# Patient Record
Sex: FEMALE | Race: WHITE | NOT HISPANIC OR LATINO
[De-identification: names, ages, dates, MRNs, and addresses within clinical notes are randomized per-mention and may not be internally consistent; named-entity substitution may affect disease eponyms.]

---

## 2017-07-05 ENCOUNTER — FORM ENCOUNTER (OUTPATIENT)
Age: 72
End: 2017-07-05

## 2017-07-25 ENCOUNTER — FORM ENCOUNTER (OUTPATIENT)
Age: 72
End: 2017-07-25

## 2017-12-27 ENCOUNTER — FORM ENCOUNTER (OUTPATIENT)
Age: 72
End: 2017-12-27

## 2018-02-07 ENCOUNTER — FORM ENCOUNTER (OUTPATIENT)
Age: 73
End: 2018-02-07

## 2018-07-11 ENCOUNTER — FORM ENCOUNTER (OUTPATIENT)
Age: 73
End: 2018-07-11

## 2018-07-23 ENCOUNTER — FORM ENCOUNTER (OUTPATIENT)
Age: 73
End: 2018-07-23

## 2019-07-15 ENCOUNTER — FORM ENCOUNTER (OUTPATIENT)
Age: 74
End: 2019-07-15

## 2019-07-24 ENCOUNTER — FORM ENCOUNTER (OUTPATIENT)
Age: 74
End: 2019-07-24

## 2020-09-09 ENCOUNTER — FORM ENCOUNTER (OUTPATIENT)
Age: 75
End: 2020-09-09

## 2020-10-04 ENCOUNTER — FORM ENCOUNTER (OUTPATIENT)
Age: 75
End: 2020-10-04

## 2021-06-03 ENCOUNTER — APPOINTMENT (OUTPATIENT)
Dept: BREAST CENTER | Facility: CLINIC | Age: 76
End: 2021-06-03
Payer: MEDICARE

## 2021-06-03 ENCOUNTER — NON-APPOINTMENT (OUTPATIENT)
Age: 76
End: 2021-06-03

## 2021-06-03 VITALS
DIASTOLIC BLOOD PRESSURE: 71 MMHG | HEART RATE: 77 BPM | SYSTOLIC BLOOD PRESSURE: 108 MMHG | BODY MASS INDEX: 20.83 KG/M2 | WEIGHT: 125 LBS | HEIGHT: 65 IN

## 2021-06-03 DIAGNOSIS — Z78.9 OTHER SPECIFIED HEALTH STATUS: ICD-10-CM

## 2021-06-03 DIAGNOSIS — N64.9 DISORDER OF BREAST, UNSPECIFIED: ICD-10-CM

## 2021-06-03 DIAGNOSIS — Z80.3 FAMILY HISTORY OF MALIGNANT NEOPLASM OF BREAST: ICD-10-CM

## 2021-06-03 DIAGNOSIS — Z00.00 ENCOUNTER FOR GENERAL ADULT MEDICAL EXAMINATION W/OUT ABNORMAL FINDINGS: ICD-10-CM

## 2021-06-03 DIAGNOSIS — Z13.71 ENCOUNTER FOR NONPROCREATIVE SCREENING FOR GENETIC DISEASE CARRIER STATUS: ICD-10-CM

## 2021-06-03 PROCEDURE — 99213 OFFICE O/P EST LOW 20 MIN: CPT

## 2021-06-04 PROBLEM — Z13.71 BRCA NEGATIVE: Status: ACTIVE | Noted: 2021-06-04

## 2021-06-04 PROBLEM — Z78.9 NON-SMOKER: Status: ACTIVE | Noted: 2021-06-03

## 2021-06-04 PROBLEM — N64.9 DISORDER OF BREAST, UNSPECIFIED: Status: ACTIVE | Noted: 2021-06-04

## 2021-06-04 PROBLEM — Z80.3 FAMILY HISTORY OF BREAST CANCER: Status: ACTIVE | Noted: 2021-06-04

## 2021-06-04 PROBLEM — Z00.00 ENCOUNTER FOR PREVENTIVE HEALTH EXAMINATION: Status: ACTIVE | Noted: 2021-05-20

## 2021-06-04 PROBLEM — Z78.9 CAFFEINE USE: Status: ACTIVE | Noted: 2021-06-03

## 2021-06-04 NOTE — PHYSICAL EXAM
[Supple] : supple [No Supraclavicular Adenopathy] : no supraclavicular adenopathy [No Cervical Adenopathy] : no cervical adenopathy [Examined in the supine and seated position] : examined in the supine and seated position [No dominant masses] : no dominant masses in right breast  [No dominant masses] : no dominant masses left breast [No Nipple Retraction] : no left nipple retraction [No Nipple Discharge] : no left nipple discharge [No Axillary Lymphadenopathy] : no left axillary lymphadenopathy

## 2021-06-06 NOTE — ASSESSMENT
[FreeTextEntry1] : 75yo BRCA (-) female with significant Fhx of breast ca in sister 73, maternal aunt 70, maternal aunt 40, maternal cousin 29, maternal cousin 30, maternal cousin 40, and maternal cousin 70. Patient presents for breast cancer screening. Bilateral mammo/sono in October 2020 BIRADS 2. Patient due for repeat mammo/sono and reexamination in October.

## 2021-06-06 NOTE — HISTORY OF PRESENT ILLNESS
[FreeTextEntry1] : 75yo BRCA (-) female previously seen by Dr. Almodovar last seen 10/5/20 by Dr. Sousa with Fhx of breast ca in sister 73, maternal aunt 70, maternal aunt 40, maternal cousin 29, maternal cousin 30, maternal cousin 40, and maternal cousin 70 presents for breast cancer screening. Of note, patient is taking exogenous estrogen. \par ISAC Arroyo Lifetime Risk Score 12.3%\par

## 2021-06-06 NOTE — PAST MEDICAL HISTORY
[Menarche Age ____] : age at menarche was [unfilled] [Menopause Age____] : age at menopause was [unfilled] [History of Hormone Replacement Treatment] : has a history of hormone replacement treatment [Total Preg ___] : G[unfilled] [Live Births ___] : P[unfilled]  [Age At Live Birth ___] : Age at live birth: [unfilled] [FreeTextEntry2] : Miscarriage  2  [FreeTextEntry7] : yes  Iud   [FreeTextEntry8] : yes

## 2021-06-06 NOTE — DATA REVIEWED
[FreeTextEntry1] : 07/06/2017: Screening B/L MG - Negative. BIRADS 1 \par 12/28/2017: B/L Screening US & palpable abnormality- R 12:00, 1cmfn, 0.5 cm septated cyst, benign. L 5:00, 1 cmfn, 0.3 cm cyst, benign. L 7:00, 2cmfn, 0.8 cm cyst, benign. BIRADS 2 \par 07/12/2018: B/L Screening MG- Negative. BIRADS 1 \par 10/05/2020: B/L Dx MG and US- extremely Dense. US yields small cysts at R 12:20, 3cmfn and L 11:00, 4cmfn both measuring 1 cm. BIRADS 2 \par

## 2021-10-12 ENCOUNTER — RESULT REVIEW (OUTPATIENT)
Age: 76
End: 2021-10-12

## 2021-10-12 ENCOUNTER — APPOINTMENT (OUTPATIENT)
Dept: ULTRASOUND IMAGING | Facility: CLINIC | Age: 76
End: 2021-10-12
Payer: MEDICARE

## 2021-10-12 ENCOUNTER — APPOINTMENT (OUTPATIENT)
Dept: MAMMOGRAPHY | Facility: CLINIC | Age: 76
End: 2021-10-12
Payer: MEDICARE

## 2021-10-12 ENCOUNTER — OUTPATIENT (OUTPATIENT)
Dept: OUTPATIENT SERVICES | Facility: HOSPITAL | Age: 76
LOS: 1 days | End: 2021-10-12

## 2021-10-12 PROCEDURE — 76830 TRANSVAGINAL US NON-OB: CPT | Mod: 26

## 2021-10-12 PROCEDURE — 76641 ULTRASOUND BREAST COMPLETE: CPT | Mod: 26,50

## 2021-10-12 PROCEDURE — 77063 BREAST TOMOSYNTHESIS BI: CPT | Mod: 26

## 2021-10-12 PROCEDURE — 77067 SCR MAMMO BI INCL CAD: CPT | Mod: 26

## 2021-10-21 ENCOUNTER — NON-APPOINTMENT (OUTPATIENT)
Age: 76
End: 2021-10-21

## 2021-10-26 ENCOUNTER — APPOINTMENT (OUTPATIENT)
Dept: BREAST CENTER | Facility: CLINIC | Age: 76
End: 2021-10-26

## 2022-02-21 ENCOUNTER — OUTPATIENT (OUTPATIENT)
Dept: OUTPATIENT SERVICES | Facility: HOSPITAL | Age: 77
LOS: 1 days | End: 2022-02-21

## 2022-02-21 ENCOUNTER — APPOINTMENT (OUTPATIENT)
Dept: RADIOLOGY | Facility: CLINIC | Age: 77
End: 2022-02-21
Payer: MEDICARE

## 2022-02-21 PROCEDURE — 77080 DXA BONE DENSITY AXIAL: CPT | Mod: 26

## 2022-10-29 ENCOUNTER — APPOINTMENT (OUTPATIENT)
Dept: ULTRASOUND IMAGING | Facility: CLINIC | Age: 77
End: 2022-10-29

## 2022-10-29 ENCOUNTER — APPOINTMENT (OUTPATIENT)
Dept: MAMMOGRAPHY | Facility: CLINIC | Age: 77
End: 2022-10-29

## 2022-10-29 ENCOUNTER — OUTPATIENT (OUTPATIENT)
Dept: OUTPATIENT SERVICES | Facility: HOSPITAL | Age: 77
LOS: 1 days | End: 2022-10-29

## 2022-10-29 PROCEDURE — 77067 SCR MAMMO BI INCL CAD: CPT | Mod: 26

## 2022-10-29 PROCEDURE — 77063 BREAST TOMOSYNTHESIS BI: CPT | Mod: 26

## 2022-10-29 PROCEDURE — 76641 ULTRASOUND BREAST COMPLETE: CPT | Mod: 26,50

## 2023-05-23 ENCOUNTER — OUTPATIENT (OUTPATIENT)
Dept: OUTPATIENT SERVICES | Facility: HOSPITAL | Age: 78
LOS: 1 days | End: 2023-05-23

## 2023-05-23 ENCOUNTER — APPOINTMENT (OUTPATIENT)
Dept: ULTRASOUND IMAGING | Facility: CLINIC | Age: 78
End: 2023-05-23
Payer: COMMERCIAL

## 2023-05-23 ENCOUNTER — APPOINTMENT (OUTPATIENT)
Dept: MAMMOGRAPHY | Facility: CLINIC | Age: 78
End: 2023-05-23
Payer: COMMERCIAL

## 2023-05-23 PROCEDURE — 77065 DX MAMMO INCL CAD UNI: CPT | Mod: 26,RT

## 2023-05-23 PROCEDURE — G0279: CPT | Mod: 26

## 2023-05-23 PROCEDURE — 76641 ULTRASOUND BREAST COMPLETE: CPT | Mod: 26,RT

## 2023-10-24 ENCOUNTER — OUTPATIENT (OUTPATIENT)
Dept: OUTPATIENT SERVICES | Facility: HOSPITAL | Age: 78
LOS: 1 days | End: 2023-10-24

## 2023-10-24 PROCEDURE — 77080 DXA BONE DENSITY AXIAL: CPT | Mod: 26

## 2023-10-30 ENCOUNTER — APPOINTMENT (OUTPATIENT)
Dept: RADIOLOGY | Facility: CLINIC | Age: 78
End: 2023-10-30
Payer: MEDICARE

## 2023-12-15 ENCOUNTER — APPOINTMENT (OUTPATIENT)
Dept: ULTRASOUND IMAGING | Facility: CLINIC | Age: 78
End: 2023-12-15
Payer: MEDICARE

## 2023-12-15 ENCOUNTER — APPOINTMENT (OUTPATIENT)
Dept: MAMMOGRAPHY | Facility: CLINIC | Age: 78
End: 2023-12-15
Payer: MEDICARE

## 2023-12-15 ENCOUNTER — OUTPATIENT (OUTPATIENT)
Dept: OUTPATIENT SERVICES | Facility: HOSPITAL | Age: 78
LOS: 1 days | End: 2023-12-15

## 2023-12-15 PROCEDURE — 77063 BREAST TOMOSYNTHESIS BI: CPT | Mod: 26

## 2023-12-15 PROCEDURE — 77067 SCR MAMMO BI INCL CAD: CPT | Mod: 26

## 2024-01-18 ENCOUNTER — EMERGENCY (EMERGENCY)
Facility: HOSPITAL | Age: 79
LOS: 1 days | Discharge: LEFT BEFORE DISCHARGE | End: 2024-01-18
Attending: EMERGENCY MEDICINE | Admitting: EMERGENCY MEDICINE
Payer: MEDICARE

## 2024-01-18 ENCOUNTER — INPATIENT (INPATIENT)
Facility: HOSPITAL | Age: 79
LOS: 0 days | Discharge: HOME | DRG: 446 | End: 2024-01-19
Attending: SURGERY | Admitting: SURGERY
Payer: COMMERCIAL

## 2024-01-18 VITALS
WEIGHT: 119.93 LBS | DIASTOLIC BLOOD PRESSURE: 74 MMHG | HEART RATE: 101 BPM | SYSTOLIC BLOOD PRESSURE: 112 MMHG | TEMPERATURE: 98 F | RESPIRATION RATE: 18 BRPM | HEIGHT: 65 IN | OXYGEN SATURATION: 97 %

## 2024-01-18 VITALS
DIASTOLIC BLOOD PRESSURE: 90 MMHG | RESPIRATION RATE: 18 BRPM | WEIGHT: 119.93 LBS | HEART RATE: 75 BPM | OXYGEN SATURATION: 96 % | TEMPERATURE: 97 F | SYSTOLIC BLOOD PRESSURE: 169 MMHG

## 2024-01-18 VITALS
SYSTOLIC BLOOD PRESSURE: 156 MMHG | HEART RATE: 73 BPM | RESPIRATION RATE: 16 BRPM | DIASTOLIC BLOOD PRESSURE: 84 MMHG | TEMPERATURE: 97 F | OXYGEN SATURATION: 99 %

## 2024-01-18 DIAGNOSIS — R11.10 VOMITING, UNSPECIFIED: ICD-10-CM

## 2024-01-18 DIAGNOSIS — Z53.29 PROCEDURE AND TREATMENT NOT CARRIED OUT BECAUSE OF PATIENT'S DECISION FOR OTHER REASONS: ICD-10-CM

## 2024-01-18 DIAGNOSIS — Z87.19 PERSONAL HISTORY OF OTHER DISEASES OF THE DIGESTIVE SYSTEM: ICD-10-CM

## 2024-01-18 DIAGNOSIS — K83.09 OTHER CHOLANGITIS: ICD-10-CM

## 2024-01-18 LAB
ALBUMIN SERPL ELPH-MCNC: 4 G/DL — SIGNIFICANT CHANGE UP (ref 3.4–5)
ALP SERPL-CCNC: 295 U/L — HIGH (ref 40–120)
ALT FLD-CCNC: 497 U/L — HIGH (ref 12–42)
ANION GAP SERPL CALC-SCNC: 10 MMOL/L — SIGNIFICANT CHANGE UP (ref 9–16)
AST SERPL-CCNC: 358 U/L — HIGH (ref 15–37)
BASOPHILS # BLD AUTO: 0.04 K/UL — SIGNIFICANT CHANGE UP (ref 0–0.2)
BASOPHILS NFR BLD AUTO: 0.5 % — SIGNIFICANT CHANGE UP (ref 0–2)
BILIRUB DIRECT SERPL-MCNC: 1 MG/DL — HIGH (ref 0–0.3)
BILIRUB SERPL-MCNC: 1.6 MG/DL — HIGH (ref 0.2–1.2)
BUN SERPL-MCNC: 10 MG/DL — SIGNIFICANT CHANGE UP (ref 7–23)
CALCIUM SERPL-MCNC: 9.8 MG/DL — SIGNIFICANT CHANGE UP (ref 8.5–10.5)
CHLORIDE SERPL-SCNC: 96 MMOL/L — SIGNIFICANT CHANGE UP (ref 96–108)
CO2 SERPL-SCNC: 30 MMOL/L — SIGNIFICANT CHANGE UP (ref 22–31)
CREAT SERPL-MCNC: 0.72 MG/DL — SIGNIFICANT CHANGE UP (ref 0.5–1.3)
EGFR: 87 ML/MIN/1.73M2 — SIGNIFICANT CHANGE UP
EOSINOPHIL # BLD AUTO: 0.2 K/UL — SIGNIFICANT CHANGE UP (ref 0–0.5)
EOSINOPHIL NFR BLD AUTO: 2.5 % — SIGNIFICANT CHANGE UP (ref 0–6)
GLUCOSE SERPL-MCNC: 148 MG/DL — HIGH (ref 70–99)
HCT VFR BLD CALC: 46.8 % — HIGH (ref 34.5–45)
HGB BLD-MCNC: 15.1 G/DL — SIGNIFICANT CHANGE UP (ref 11.5–15.5)
IMM GRANULOCYTES NFR BLD AUTO: 0.3 % — SIGNIFICANT CHANGE UP (ref 0–0.9)
LIDOCAIN IGE QN: 22 U/L — SIGNIFICANT CHANGE UP (ref 16–77)
LYMPHOCYTES # BLD AUTO: 0.69 K/UL — LOW (ref 1–3.3)
LYMPHOCYTES # BLD AUTO: 8.8 % — LOW (ref 13–44)
MCHC RBC-ENTMCNC: 29.8 PG — SIGNIFICANT CHANGE UP (ref 27–34)
MCHC RBC-ENTMCNC: 32.3 GM/DL — SIGNIFICANT CHANGE UP (ref 32–36)
MCV RBC AUTO: 92.3 FL — SIGNIFICANT CHANGE UP (ref 80–100)
MONOCYTES # BLD AUTO: 0.46 K/UL — SIGNIFICANT CHANGE UP (ref 0–0.9)
MONOCYTES NFR BLD AUTO: 5.8 % — SIGNIFICANT CHANGE UP (ref 2–14)
NEUTROPHILS # BLD AUTO: 6.47 K/UL — SIGNIFICANT CHANGE UP (ref 1.8–7.4)
NEUTROPHILS NFR BLD AUTO: 82.1 % — HIGH (ref 43–77)
NRBC # BLD: 0 /100 WBCS — SIGNIFICANT CHANGE UP (ref 0–0)
PLATELET # BLD AUTO: 267 K/UL — SIGNIFICANT CHANGE UP (ref 150–400)
POTASSIUM SERPL-MCNC: 3.2 MMOL/L — LOW (ref 3.5–5.3)
POTASSIUM SERPL-SCNC: 3.2 MMOL/L — LOW (ref 3.5–5.3)
PROT SERPL-MCNC: 8.3 G/DL — HIGH (ref 6.4–8.2)
RBC # BLD: 5.07 M/UL — SIGNIFICANT CHANGE UP (ref 3.8–5.2)
RBC # FLD: 12.9 % — SIGNIFICANT CHANGE UP (ref 10.3–14.5)
SODIUM SERPL-SCNC: 136 MMOL/L — SIGNIFICANT CHANGE UP (ref 132–145)
WBC # BLD: 7.88 K/UL — SIGNIFICANT CHANGE UP (ref 3.8–10.5)
WBC # FLD AUTO: 7.88 K/UL — SIGNIFICANT CHANGE UP (ref 3.8–10.5)

## 2024-01-18 PROCEDURE — 93010 ELECTROCARDIOGRAM REPORT: CPT

## 2024-01-18 PROCEDURE — 74177 CT ABD & PELVIS W/CONTRAST: CPT | Mod: 26,MH

## 2024-01-18 PROCEDURE — 99285 EMERGENCY DEPT VISIT HI MDM: CPT

## 2024-01-18 PROCEDURE — 76705 ECHO EXAM OF ABDOMEN: CPT | Mod: 26

## 2024-01-18 PROCEDURE — 99223 1ST HOSP IP/OBS HIGH 75: CPT

## 2024-01-18 RX ORDER — PIPERACILLIN AND TAZOBACTAM 4; .5 G/20ML; G/20ML
3.38 INJECTION, POWDER, LYOPHILIZED, FOR SOLUTION INTRAVENOUS ONCE
Refills: 0 | Status: COMPLETED | OUTPATIENT
Start: 2024-01-18 | End: 2024-01-18

## 2024-01-18 RX ORDER — PIPERACILLIN AND TAZOBACTAM 4; .5 G/20ML; G/20ML
3.38 INJECTION, POWDER, LYOPHILIZED, FOR SOLUTION INTRAVENOUS EVERY 8 HOURS
Refills: 0 | Status: DISCONTINUED | OUTPATIENT
Start: 2024-01-18 | End: 2024-01-18

## 2024-01-18 RX ORDER — SODIUM CHLORIDE 9 MG/ML
1000 INJECTION INTRAMUSCULAR; INTRAVENOUS; SUBCUTANEOUS ONCE
Refills: 0 | Status: COMPLETED | OUTPATIENT
Start: 2024-01-18 | End: 2024-01-18

## 2024-01-18 RX ORDER — POTASSIUM CHLORIDE 20 MEQ
10 PACKET (EA) ORAL
Refills: 0 | Status: COMPLETED | OUTPATIENT
Start: 2024-01-18 | End: 2024-01-19

## 2024-01-18 RX ORDER — PIPERACILLIN AND TAZOBACTAM 4; .5 G/20ML; G/20ML
3.38 INJECTION, POWDER, LYOPHILIZED, FOR SOLUTION INTRAVENOUS ONCE
Refills: 0 | Status: COMPLETED | OUTPATIENT
Start: 2024-01-19 | End: 2024-01-19

## 2024-01-18 RX ORDER — SODIUM CHLORIDE 9 MG/ML
1000 INJECTION, SOLUTION INTRAVENOUS
Refills: 0 | Status: DISCONTINUED | OUTPATIENT
Start: 2024-01-18 | End: 2024-01-19

## 2024-01-18 RX ORDER — PANTOPRAZOLE SODIUM 20 MG/1
1 TABLET, DELAYED RELEASE ORAL
Refills: 0 | DISCHARGE

## 2024-01-18 RX ORDER — ONDANSETRON 8 MG/1
4 TABLET, FILM COATED ORAL ONCE
Refills: 0 | Status: COMPLETED | OUTPATIENT
Start: 2024-01-18 | End: 2024-01-18

## 2024-01-18 RX ORDER — PIPERACILLIN AND TAZOBACTAM 4; .5 G/20ML; G/20ML
3.38 INJECTION, POWDER, LYOPHILIZED, FOR SOLUTION INTRAVENOUS EVERY 8 HOURS
Refills: 0 | Status: DISCONTINUED | OUTPATIENT
Start: 2024-01-19 | End: 2024-01-19

## 2024-01-18 RX ORDER — PANTOPRAZOLE SODIUM 20 MG/1
40 TABLET, DELAYED RELEASE ORAL
Refills: 0 | Status: DISCONTINUED | OUTPATIENT
Start: 2024-01-19 | End: 2024-01-19

## 2024-01-18 RX ORDER — MORPHINE SULFATE 50 MG/1
4 CAPSULE, EXTENDED RELEASE ORAL ONCE
Refills: 0 | Status: DISCONTINUED | OUTPATIENT
Start: 2024-01-18 | End: 2024-01-18

## 2024-01-18 RX ORDER — FAMOTIDINE 10 MG/ML
20 INJECTION INTRAVENOUS ONCE
Refills: 0 | Status: COMPLETED | OUTPATIENT
Start: 2024-01-18 | End: 2024-01-18

## 2024-01-18 RX ORDER — POTASSIUM CHLORIDE 20 MEQ
40 PACKET (EA) ORAL ONCE
Refills: 0 | Status: COMPLETED | OUTPATIENT
Start: 2024-01-18 | End: 2024-01-18

## 2024-01-18 RX ADMIN — MORPHINE SULFATE 4 MILLIGRAM(S): 50 CAPSULE, EXTENDED RELEASE ORAL at 09:25

## 2024-01-18 RX ADMIN — Medication 100 MILLIEQUIVALENT(S): at 22:41

## 2024-01-18 RX ADMIN — SODIUM CHLORIDE 1000 MILLILITER(S): 9 INJECTION INTRAMUSCULAR; INTRAVENOUS; SUBCUTANEOUS at 09:26

## 2024-01-18 RX ADMIN — PIPERACILLIN AND TAZOBACTAM 200 GRAM(S): 4; .5 INJECTION, POWDER, LYOPHILIZED, FOR SOLUTION INTRAVENOUS at 20:35

## 2024-01-18 RX ADMIN — Medication 40 MILLIEQUIVALENT(S): at 12:44

## 2024-01-18 RX ADMIN — SODIUM CHLORIDE 90 MILLILITER(S): 9 INJECTION, SOLUTION INTRAVENOUS at 20:50

## 2024-01-18 RX ADMIN — ONDANSETRON 4 MILLIGRAM(S): 8 TABLET, FILM COATED ORAL at 09:25

## 2024-01-18 RX ADMIN — FAMOTIDINE 20 MILLIGRAM(S): 10 INJECTION INTRAVENOUS at 09:25

## 2024-01-18 RX ADMIN — PIPERACILLIN AND TAZOBACTAM 200 GRAM(S): 4; .5 INJECTION, POWDER, LYOPHILIZED, FOR SOLUTION INTRAVENOUS at 14:51

## 2024-01-18 RX ADMIN — Medication 100 MILLIEQUIVALENT(S): at 20:49

## 2024-01-18 NOTE — H&P ADULT - NSICDXPASTMEDICALHX_GEN_ALL_CORE_FT
PAST MEDICAL HISTORY:  Acute diverticulitis     GERD (gastroesophageal reflux disease)     Hiatal hernia

## 2024-01-18 NOTE — ED ADULT NURSE NOTE - NSFALLUNIVINTERV_ED_ALL_ED
Bed/Stretcher in lowest position, wheels locked, appropriate side rails in place/Call bell, personal items and telephone in reach/Instruct patient to call for assistance before getting out of bed/chair/stretcher/Non-slip footwear applied when patient is off stretcher/[place] to call system/Physically safe environment - no spills, clutter or unnecessary equipment/Purposeful proactive rounding/Room/bathroom lighting operational, light cord in reach

## 2024-01-18 NOTE — ED ADULT TRIAGE NOTE - CHIEF COMPLAINT QUOTE
Pt presents to ED c/o pre op testing. Sent in for Dr. Magallon for gallbladder removal. +abdominal pain for a couple of days.

## 2024-01-18 NOTE — ED ADULT NURSE REASSESSMENT NOTE - NS ED NURSE REASSESS COMMENT FT1
Report received from Amber RICO for continuity of care. Pt in bed reporting 0/10 pain. Pt pending provider discussion. Plan of care ongoing

## 2024-01-18 NOTE — ED ADULT NURSE REASSESSMENT NOTE - NS ED NURSE REASSESS COMMENT FT1
patient is currently at [facility]; they tok themselves there after leaving the ED here at [facility]; left prior to RN returning back from lunch break at 1600; possibly left the ED between [id number] according to TRISHA Joyner who had patient under their care; patient was allowed to leave the ED to go home by MD attending Emily; possibly left the ED with iv intact as TRISHA Joyner was unaware that patient was leaving the eD and did not remove IV; iv as not in garbage can when searched either; ANGELA Dobbins and Ralph both aware of situation.

## 2024-01-18 NOTE — ED ADULT TRIAGE NOTE - CHIEF COMPLAINT QUOTE
Pt complaining of abd pain x 3 days with vomiting and diarrhea this morning. Pt states that she is having a diverticulitis flare up.

## 2024-01-18 NOTE — H&P ADULT - ASSESSMENT
77yo Female pt with PMH GERD and diverticulosis with recent diverticulitis within last year treated with abx (per chart review also PSVT, hepatitis C), PSHx hip surgery (2 years ago s/p mechanical fall) presents to [facility] ED on 1/18 with 3-day history of worsening epigastric pain. Afebrile, nontachycardic, hypertensive to 160s. On exam, abdomen is soft, nondistended, mild ttp in epigastric and RUQ without rebound or guarding. Labs show WBC 7.88, K 3.2, T bili 1.6m Alk Phos 295, AST//497. CT AP demonstrates dilated CBD to 1.3cm with wall thickening; gallbladder with wall thickening and possible wall edema; probable wall thickening of cystic duct. RUQ US demonstrates dilated CBD to 11.5mm with mild smooth wall thickening up to 4mm, biliary sludge.    Admit to general surgery, telemetry, Dr. Magallon, team 1  NPO/IVF  Zosyn  GI consult for ERCP  Holding chem DVT ppx in setting of possible ERCP  SCDs/OOBA/IS     77yo Female pt with PMH GERD and diverticulosis with recent diverticulitis within last year treated with abx (per chart review also PSVT, hepatitis C), PSHx hip surgery (2 years ago s/p mechanical fall) presents to [facility] ED on 1/18 with 3-day history of worsening epigastric pain. Afebrile, nontachycardic, hypertensive to 160s. On exam, abdomen is soft, nondistended, mild ttp in epigastric and RUQ without rebound or guarding. Labs show WBC 7.88, K 3.2, T bili 1.6m Alk Phos 295, AST//497. CT AP demonstrates dilated CBD to 1.3cm with wall thickening; gallbladder with wall thickening and possible wall edema; probable wall thickening of cystic duct. RUQ US demonstrates dilated CBD to 11.5mm with mild smooth wall thickening up to 4mm, biliary sludge.    Presentation concerning for choledocholithiasis without cholangitis, possible concomitant acute vs. acute on chronic cholecystitis - will admit with plans to decompress CBD and possible cholecystectomy  Admit to general surgery, telemetry, Dr. Magallon, team 1  NPO/IVF  Zosyn  GI consult for ERCP  Holding chem DVT ppx in setting of possible ERCP  SCDs/OOBA/IS

## 2024-01-18 NOTE — ED PROVIDER NOTE - OBJECTIVE STATEMENT
78-year-old female with no significant past medical history, history of diverticulitis episode for the first time 1 year ago, presents to the ED with complaints of several episodes of vomiting and diffuse abdominal pain worse in the left lower quadrant over the last 1 to 2 days, till this morning was not able to tolerate p.o.  She ate a small amount of breakfast this morning and vomited, has been able to keep down water.  No fever/chills, associated with this a few episodes of nonbloody watery diarrhea.  No recent travel, no recent antibiotic use no other complaints.  Feels like her prior episode of diverticulitis that she had approximately 1 year ago.

## 2024-01-18 NOTE — ED PROVIDER NOTE - PHYSICAL EXAMINATION
VSS in NAD non toxic appearing   NCAT EOMI PERRL OP clear  heart RRR no murmur   lungs CTA no wheezing no rales no rhonchi   abd soft diffuse lower abd tenderness ND no CVAT (+) mild guarding no rebound   normal neuro exam CN I-XII grossly intact, no groos motor or sensory deficits  no peripheral c/c/e

## 2024-01-18 NOTE — ED ADULT NURSE REASSESSMENT NOTE - NS ED NURSE REASSESS COMMENT FT1
at return to unit from break at 1600 patient was noted to not be in room assigned to her. Verbal hand off was done by TRISHA Joyner at 1600; as per RN Ar patient has not been seen in ED since around 1500. He was told that the "patient was allowed to go home to take care of a few things. She left with her IV in place."; charge nurse Felice was not aware patient was not on the unit anymore; spoke with primary provider attending MD Diaz who said she allowed the patient to leave and go home. She said the patient has to help take care of her disabled  and a friend is coming over to the house to watch him. She wanted to make sure he was ok and that his meds and her meds were all set up. She allowed her to leave with her IV in place and that the patient promised to return back to the ED.  As of 1700 patient was not on the unit but EMS transport was on unit to take patient up to [facility]; patient is admitted to [facility]; no bed assigned was on the board and [facility] a [facility] was told that patient was not here in the ED anymore and will come back at a later time.  As of 1826 patient has still not returned back to the Emergency room; provider Monico aware

## 2024-01-18 NOTE — ED PROVIDER NOTE - CLINICAL SUMMARY MEDICAL DECISION MAKING FREE TEXT BOX
Pt presents to ED after AMA from [facility] - surgery already at bedside at my presentation.  No need for further blood work as it was done at [facility].  CT with findings of dilated CBD and US with + gb sludge and dilated cbd.  Pt to be admitted for surgery most likely for MRCP vs ERCP and cholecystectomy.

## 2024-01-18 NOTE — ED ADULT NURSE NOTE - NS ED NURSE ELOPE COMMENTS
MD Diaz called patient at 1950 to see if they were returning to ED for care and admission; patient told MD Diaz on the phone that they already went to [facility] themselves but forgot to call

## 2024-01-18 NOTE — H&P ADULT - NSHPLABSRESULTS_GEN_ALL_CORE
15.1   7.88  )-----------( 267      ( 18 Jan 2024 09:12 )             46.8     01-18    136  |  96  |  10  ----------------------------<  148<H>  3.2<L>   |  30  |  0.72    Ca    9.8      18 Jan 2024 09:12    TPro  x   /  Alb  x   /  TBili  x   /  DBili  1.0<H>  /  AST  x   /  ALT  x   /  AlkPhos  x   01-18          INTERPRETATION:  CLINICAL INFORMATION: 78-year-old female. Left lower   quadrant pain.    COMPARISON: None.    CONTRAST/COMPLICATIONS:  IV Contrast: Omnipaque 350  96 cc administered .  Oral Contrast: None  Complications: None    PROCEDURE:  CT of the Abdomen and Pelvis was performed.  Sagittal and coronal reformats were performed.    FINDINGS:  LOWER CHEST: Suggestion of a small hiatal hernia..    LIVER: Nonenlarged..  BILE DUCTS: No intrahepatic biliary dilatation. The common bile duct is   dilated up to at least 1.3 cm. Wall thickening of the CBD.  GALLBLADDER: No radiopaque gallstones. The gallbladder is elongated with   diameter up to 3.6 cm and shows mild wall thickening. Possible   gallbladder wall edema. Probable wall thickening of cystic duct.  SPLEEN: Within normal limits.  PANCREAS: Within normal limits. The main pancreatic duct drains via the   major papilla.  ADRENALS: Within normal limits.  KIDNEYS/URETERS: Within normal limits.    BLADDER: Within normal limits.  REPRODUCTIVE ORGANS: Enlarged calcified multi fibroid uterus. The uterus   measures 8.6 x 9.8 x 8.3 cm. Unremarkable ovaries.    BOWEL: Limited evaluation of bowel due to lack of oral contrast material.   No bowel obstruction or ileus. Colon loaded with fecal material.   Scattered colonic diverticula predominantly involving sigmoid.. No   evidence of colonic diverticulitis or colitis. Normal appendix.  PERITONEUM: No ascites.  VESSELS: Within normal limits.  RETROPERITONEUM/LYMPH NODES: No lymphadenopathy.  ABDOMINAL WALL: Within normal limits.  BONES: Osteitis pubis. Three left proximal femoral Jin pins.   Degenerative disc disease L2-L3 and L5-S1. Lumbar levoscoliosis.    IMPRESSION:  No evidence of colonic diverticulitis or colitis.  Normal appendix.  Dilated thick walled CBD. Findings can be correlated with MR/MRCP and   abdominal ultrasound.

## 2024-01-18 NOTE — ED ADULT NURSE NOTE - NSFALLUNIVINTERV_ED_ALL_ED
Bed/Stretcher in lowest position, wheels locked, appropriate side rails in place/Call bell, personal items and telephone in reach/Instruct patient to call for assistance before getting out of bed/chair/stretcher/Non-slip footwear applied when patient is off stretcher/[place] to call system/Physically safe environment - no spills, clutter or unnecessary equipment/Purposeful proactive rounding/Room/bathroom lighting operational, light cord in reach

## 2024-01-18 NOTE — ED ADULT NURSE NOTE - OBJECTIVE STATEMENT
pt received into spot 20 A&Ox4 ambulatory appears comfortable arrives complaining of abd pain N/V inability to tolerate PO with some loose stools over the lastr few days to a week. reports hx diverticulitis felt similar. went to [facility] for eval had labs done and was given morphine that helped. signed out AMA to come uptown to [facility] for admission. Pt reports pain 3/10 at this time denies nausea. Respirations unlabored abd nondistended no CP SOB LE swelling. 12 lead ekg done. pt declined labs or IV when offered stating she had this done earlier today. Pending ED MD conley no distress noted educated on reasoning for IV access and lab work for pre op admission clearance

## 2024-01-18 NOTE — H&P ADULT - NSHPPHYSICALEXAM_GEN_ALL_CORE
T(C): 36.8 (01-18-24 @ 18:55), Max: 36.8 (01-18-24 @ 18:55)  HR: 101 (01-18-24 @ 18:55) (73 - 101)  BP: 112/74 (01-18-24 @ 18:55) (112/74 - 169/90)  RR: 18 (01-18-24 @ 18:55) (16 - 18)  SpO2: 97% (01-18-24 @ 18:55) (96% - 99%)    Physical Exam  General: AAOx3, NAD, laying comfortably in bed  Cardio: S1,S2, No MRG  Pulm: Nonlabored breathing  Abdomen: soft, nondistended, mild epigastric and RUQ ttp without rebound or guarding  Extremities: WWP, peripheral pulses appreciated

## 2024-01-18 NOTE — ED ADULT NURSE REASSESSMENT NOTE - NS ED NURSE REASSESS COMMENT FT1
as of 1946 patient has still not arrived back to ED from home; provider Emily ray and charge TRISHA ray

## 2024-01-18 NOTE — ED ADULT NURSE NOTE - NS TRANSFER PATIENT BELONGINGS
Medications reviewed and updated.  Denies known Latex allergy or symptoms of Latex sensitivity.  Social History     Tobacco Use   Smoking Status Never Smoker   Smokeless Tobacco Never Used     Patient would like communication of their results via:        Cell Phone:   Telephone Information:   Mobile [phone]     Okay to leave a message containing results? Yes    
Date: 5/19/2021 Time: 12:16 PM     Magali Mg is a 68 year old female who presents for follow-up bariatric status and weight gain.  Patient had an endoscopic gastric outlet reduction in 2019. She lost 30 lb with the procedure postop.  She then has gained a significant amount of weight during COVID.    Vitals:    05/17/21 0946   BP: 134/85   Pulse: 62   Temp: 98 °F (36.7 °C)       Physical Exam:  General Appearance: NAD  Soft, nondistended, nontender    Assessment:  Status post bariatric surgery with weight regain, doing well.    Plan:   · We will plan for EGD to evaluate gastric pouch and gastrojejunostomy    Michael Wild MD      SURGERY SCHEDULING REQUIREMENTS:  Procedure: EGD  Facility:  [facility]  Admission Type:  outpatient  Time Needed: 15 min  Anesthesia: Mac  Surgical Assist:  yes, PA  Co-Surgeon: No  Preoperative Orders: Done  Postoperative Appointment: none  Special Equipment:   none    Advanced Directives: does not have an advance directive.  Additional Comment: none    
Clothing

## 2024-01-18 NOTE — ED PROVIDER NOTE - OBJECTIVE STATEMENT
HPI from [facility]: 78-year-old female with no significant past medical history, history of diverticulitis episode for the first time 1 year ago, presents to the ED with complaints of several episodes of vomiting and diffuse abdominal pain worse in the left lower quadrant over the last 1 to 2 days, till this morning was not able to tolerate p.o.  She ate a small amount of breakfast this morning and vomited, has been able to keep down water.  No fever/chills, associated with this a few episodes of nonbloody watery diarrhea.  No recent travel, no recent antibiotic use no other complaints.  Feels like her prior episode of diverticulitis that she had approximately 1 year ago.    Pt signed out AMA and came to [facility] for admission to surgery. HPI from [facility]: 78-year-old female with no significant past medical history, history of diverticulitis episode for the first time 1 year ago, presents to the ED with complaints of several episodes of vomiting and diffuse abdominal pain worse in the left lower quadrant over the last 1 to 2 days, till this morning was not able to tolerate p.o.  She ate a small amount of breakfast this morning and vomited, has been able to keep down water.  No fever/chills, associated with this a few episodes of nonbloody watery diarrhea.  No recent travel, no recent antibiotic use no other complaints.  Feels like her prior episode of diverticulitis that she had approximately 1 year ago.    Pt signed out AMA and came to [facility] for admission to surgery for findings of dilated CBD and r/o cholangitis.  Pt comfortable in ED - declining pain meds in ED.  Denies other sig pmh.

## 2024-01-18 NOTE — ED PROVIDER NOTE - PROGRESS NOTE DETAILS
CT and US findings noted, suspect cholangitis, pt stable VS non toxic appearing, called medicine dr romo for admission, she requested GI and surgery consults. Attempted tor each GI through [facility] but there was no answer from the fellow and attending. Spoke with Dr choe from surgery, imaging was reviewed and she will follow inpatient. Patient contacted her PCP who recommended that she could have the studies done as an outpatient, however she was unable to arrange this.  The patient requested to go home to care for her disabled  and arrange things before the admission.  She said she would go for 2 hours, but at 8 PM has not returned.  I called and left a message on her voicemail requesting her to return for hospital admission.  Will dispo as eloped.

## 2024-01-18 NOTE — ED PROVIDER NOTE - CLINICAL SUMMARY MEDICAL DECISION MAKING FREE TEXT BOX
cholangitis vs acalculus cholecystitis vs other obstructive lesion, will admit to medicine, GI consult when available. Spoke with surgery, they tanya follow, IV abx starTED IN ed.

## 2024-01-19 ENCOUNTER — TRANSCRIPTION ENCOUNTER (OUTPATIENT)
Age: 79
End: 2024-01-19

## 2024-01-19 VITALS
RESPIRATION RATE: 17 BRPM | DIASTOLIC BLOOD PRESSURE: 53 MMHG | SYSTOLIC BLOOD PRESSURE: 115 MMHG | TEMPERATURE: 98 F | HEART RATE: 86 BPM | OXYGEN SATURATION: 96 %

## 2024-01-19 DIAGNOSIS — K80.20 CALCULUS OF GALLBLADDER W/OUT CHOLECYSTITIS W/OUT OBSTRUCTION: ICD-10-CM

## 2024-01-19 PROBLEM — K57.92 DIVERTICULITIS OF INTESTINE, PART UNSPECIFIED, WITHOUT PERFORATION OR ABSCESS WITHOUT BLEEDING: Chronic | Status: ACTIVE | Noted: 2024-01-18

## 2024-01-19 LAB
ALBUMIN SERPL ELPH-MCNC: 3.5 G/DL — SIGNIFICANT CHANGE UP (ref 3.3–5)
ALP SERPL-CCNC: 225 U/L — HIGH (ref 40–120)
ALT FLD-CCNC: 269 U/L — HIGH (ref 10–45)
ANION GAP SERPL CALC-SCNC: 10 MMOL/L — SIGNIFICANT CHANGE UP (ref 5–17)
AST SERPL-CCNC: 157 U/L — HIGH (ref 10–40)
BILIRUB SERPL-MCNC: 1 MG/DL — SIGNIFICANT CHANGE UP (ref 0.2–1.2)
BLD GP AB SCN SERPL QL: NEGATIVE — SIGNIFICANT CHANGE UP
BUN SERPL-MCNC: 8 MG/DL — SIGNIFICANT CHANGE UP (ref 7–23)
CALCIUM SERPL-MCNC: 9.2 MG/DL — SIGNIFICANT CHANGE UP (ref 8.4–10.5)
CHLORIDE SERPL-SCNC: 103 MMOL/L — SIGNIFICANT CHANGE UP (ref 96–108)
CO2 SERPL-SCNC: 24 MMOL/L — SIGNIFICANT CHANGE UP (ref 22–31)
CREAT SERPL-MCNC: 0.81 MG/DL — SIGNIFICANT CHANGE UP (ref 0.5–1.3)
EGFR: 75 ML/MIN/1.73M2 — SIGNIFICANT CHANGE UP
GLUCOSE SERPL-MCNC: 93 MG/DL — SIGNIFICANT CHANGE UP (ref 70–99)
HCT VFR BLD CALC: 42.2 % — SIGNIFICANT CHANGE UP (ref 34.5–45)
HCV AB SERPL-IMP: REACTIVE
HCV RNA FLD QL NAA+PROBE: SIGNIFICANT CHANGE UP
HCV RNA SPEC QL PROBE+SIG AMP: SIGNIFICANT CHANGE UP
HGB BLD-MCNC: 13.9 G/DL — SIGNIFICANT CHANGE UP (ref 11.5–15.5)
MAGNESIUM SERPL-MCNC: 1.8 MG/DL — SIGNIFICANT CHANGE UP (ref 1.6–2.6)
MCHC RBC-ENTMCNC: 30.3 PG — SIGNIFICANT CHANGE UP (ref 27–34)
MCHC RBC-ENTMCNC: 32.9 GM/DL — SIGNIFICANT CHANGE UP (ref 32–36)
MCV RBC AUTO: 92.1 FL — SIGNIFICANT CHANGE UP (ref 80–100)
NRBC # BLD: 0 /100 WBCS — SIGNIFICANT CHANGE UP (ref 0–0)
PHOSPHATE SERPL-MCNC: 3.6 MG/DL — SIGNIFICANT CHANGE UP (ref 2.5–4.5)
PLATELET # BLD AUTO: 259 K/UL — SIGNIFICANT CHANGE UP (ref 150–400)
POTASSIUM SERPL-MCNC: 4.4 MMOL/L — SIGNIFICANT CHANGE UP (ref 3.5–5.3)
POTASSIUM SERPL-SCNC: 4.4 MMOL/L — SIGNIFICANT CHANGE UP (ref 3.5–5.3)
PROT SERPL-MCNC: 6.4 G/DL — SIGNIFICANT CHANGE UP (ref 6–8.3)
RBC # BLD: 4.58 M/UL — SIGNIFICANT CHANGE UP (ref 3.8–5.2)
RBC # FLD: 13.3 % — SIGNIFICANT CHANGE UP (ref 10.3–14.5)
RH IG SCN BLD-IMP: POSITIVE — SIGNIFICANT CHANGE UP
SODIUM SERPL-SCNC: 137 MMOL/L — SIGNIFICANT CHANGE UP (ref 135–145)
WBC # BLD: 8.36 K/UL — SIGNIFICANT CHANGE UP (ref 3.8–10.5)
WBC # FLD AUTO: 8.36 K/UL — SIGNIFICANT CHANGE UP (ref 3.8–10.5)

## 2024-01-19 PROCEDURE — 84100 ASSAY OF PHOSPHORUS: CPT

## 2024-01-19 PROCEDURE — 86803 HEPATITIS C AB TEST: CPT

## 2024-01-19 PROCEDURE — 85027 COMPLETE CBC AUTOMATED: CPT

## 2024-01-19 PROCEDURE — 80053 COMPREHEN METABOLIC PANEL: CPT

## 2024-01-19 PROCEDURE — 99238 HOSP IP/OBS DSCHRG MGMT 30/<: CPT

## 2024-01-19 PROCEDURE — 86901 BLOOD TYPING SEROLOGIC RH(D): CPT

## 2024-01-19 PROCEDURE — 93005 ELECTROCARDIOGRAM TRACING: CPT

## 2024-01-19 PROCEDURE — 36415 COLL VENOUS BLD VENIPUNCTURE: CPT

## 2024-01-19 PROCEDURE — 83735 ASSAY OF MAGNESIUM: CPT

## 2024-01-19 PROCEDURE — 99285 EMERGENCY DEPT VISIT HI MDM: CPT

## 2024-01-19 PROCEDURE — 86900 BLOOD TYPING SEROLOGIC ABO: CPT

## 2024-01-19 PROCEDURE — 99222 1ST HOSP IP/OBS MODERATE 55: CPT | Mod: GC

## 2024-01-19 PROCEDURE — 87521 HEPATITIS C PROBE&RVRS TRNSC: CPT

## 2024-01-19 PROCEDURE — 86850 RBC ANTIBODY SCREEN: CPT

## 2024-01-19 RX ORDER — OXYCODONE HYDROCHLORIDE 5 MG/1
2.5 TABLET ORAL EVERY 6 HOURS
Refills: 0 | Status: DISCONTINUED | OUTPATIENT
Start: 2024-01-19 | End: 2024-01-19

## 2024-01-19 RX ORDER — ACETAMINOPHEN 500 MG
650 TABLET ORAL EVERY 6 HOURS
Refills: 0 | Status: DISCONTINUED | OUTPATIENT
Start: 2024-01-19 | End: 2024-01-19

## 2024-01-19 RX ORDER — OXYCODONE HYDROCHLORIDE 5 MG/1
5 TABLET ORAL EVERY 6 HOURS
Refills: 0 | Status: DISCONTINUED | OUTPATIENT
Start: 2024-01-19 | End: 2024-01-19

## 2024-01-19 RX ORDER — ONDANSETRON 8 MG/1
4 TABLET, FILM COATED ORAL EVERY 6 HOURS
Refills: 0 | Status: DISCONTINUED | OUTPATIENT
Start: 2024-01-19 | End: 2024-01-19

## 2024-01-19 RX ADMIN — PIPERACILLIN AND TAZOBACTAM 25 GRAM(S): 4; .5 INJECTION, POWDER, LYOPHILIZED, FOR SOLUTION INTRAVENOUS at 05:21

## 2024-01-19 RX ADMIN — PIPERACILLIN AND TAZOBACTAM 25 GRAM(S): 4; .5 INJECTION, POWDER, LYOPHILIZED, FOR SOLUTION INTRAVENOUS at 00:21

## 2024-01-19 RX ADMIN — Medication 650 MILLIGRAM(S): at 10:15

## 2024-01-19 RX ADMIN — Medication 100 MILLIEQUIVALENT(S): at 02:37

## 2024-01-19 RX ADMIN — Medication 650 MILLIGRAM(S): at 09:15

## 2024-01-19 RX ADMIN — Medication 100 MILLIEQUIVALENT(S): at 00:22

## 2024-01-19 RX ADMIN — PANTOPRAZOLE SODIUM 40 MILLIGRAM(S): 20 TABLET, DELAYED RELEASE ORAL at 07:21

## 2024-01-19 NOTE — PROGRESS NOTE ADULT - ASSESSMENT
79yo Female pt with PMH GERD and diverticulosis with recent diverticulitis within last year treated with abx (per chart review also PSVT, hepatitis C), PSHx hip surgery (2 years ago s/p mechanical fall) presents to [facility] ED on 1/18 with 3-day history of worsening epigastric pain. Afebrile, nontachycardic, hypertensive to 160s. On exam, abdomen is soft, nondistended, mild ttp in epigastric and RUQ without rebound or guarding. Labs show WBC 7.88, K 3.2, T bili 1.6m Alk Phos 295, AST//497. CT AP demonstrates dilated CBD to 1.3cm with wall thickening; gallbladder with wall thickening and possible wall edema; probable wall thickening of cystic duct. RUQ US demonstrates dilated CBD to 11.5mm with mild smooth wall thickening up to 4mm, biliary sludge. r/o choledocholitasis    NPO/IVF  MRCP  GI Consulted  PPI  Holding chem DVT ppx in setting of possible ERCP  SCDs/OOBA/IS

## 2024-01-19 NOTE — CONSULT NOTE ADULT - ASSESSMENT
77yo Female pt with PMH GERD and diverticulosis with recent diverticulitis within last year treated with abx (per chart review also PSVT, hepatitis C), PSHx hip surgery (2 years ago s/p mechanical fall) presents to [facility] ED on 1/18 with 3-day history of worsening epigastric pain, found to have stable VS, no leukocytosis, elevated lfts (bili 1.6, ap 295, ast 358, alt 497) and dilated duct on imaging. Suspicious for choledocholithiasis without cholangitis. GI consulted for consideration of ERCP.    #Elevated LFTs  #Dilated CBD  #Epigastric/ RUQ abdominal pain  Findings concerning for choledocholithiasis, although lfts have now downtrended (bili 1) and abdominal pain resolving  Plan for MRCP  ERCP +/- pending imaging  Continue to monitor lfts  Rest of care per surgical service    Vivi Jean MD  Gastroenterology Fellow, PGY -6  Weekday Pager [phone]

## 2024-01-19 NOTE — DISCHARGE NOTE NURSING/CASE MANAGEMENT/SOCIAL WORK - PATIENT PORTAL LINK FT
You can access the FollowMyHealth Patient Portal offered by [facility] by registering at the following website: [email or web address]. By joining [employer]’s FollowMyHealth portal, you will also be able to view your health information using other applications (apps) compatible with our system.

## 2024-01-19 NOTE — CONSULT NOTE ADULT - ATTENDING COMMENTS
Patient seen, examined, and discussed with Dr. Jean. Agree with above. 78F with a h/o GERD and diverticulosis with recent diverticulitis within last year treated with abx (per chart review also PSVT, hepatitis C), PSHx hip surgery (2 years ago s/p mechanical fall) presents to [facility] ED on 1/18 with 3-day history of worsening epigastric pain, found to have stable VS, no leukocytosis, elevated lfts (bili 1.6, ap 295, ast 358, alt 497) and dilated duct on imaging. Suspicious for choledocholithiasis without cholangitis. GI consulted for consideration of ERCP. CT and US reviewed with approx 12 mm bile duct with wall thickening most likely 2/2 passed stones/sludge. Liver enzymes have downtrended and pain is resolved, further supporting passed stone/sludge. Obtain MRCP.     Presley Marina MD  Gastroenterology

## 2024-01-19 NOTE — DISCHARGE NOTE PROVIDER - NSDCFUADDINST_GEN_ALL_CORE_FT
Please follow up with Dr. Magallon in 1-2 weeks, you may call [phone] to schedule an appointment.    Warning Signs:  Please call your doctor or nurse practitioner if you experience the following:  *You experience new chest pain, pressure, squeezing or tightness.  *New or worsening cough, shortness of breath, or wheeze.  *If you are vomiting and cannot keep down fluids or your medications.  *You are getting dehydrated due to continued vomiting, diarrhea, or other reasons. Signs of dehydration include dry mouth, rapid heartbeat, or feeling dizzy or faint when standing.  *You see blood or dark/black material when you vomit or have a bowel movement.  *You experience burning when you urinate, have blood in your urine, or experience a discharge.  *Your pain is not improving within 8-12 hours or is not gone within 24 hours. Call or return immediately if your pain is getting worse, changes location, or moves to your chest or back.  *You have shaking chills, or fever greater than 101.5 degrees Fahrenheit or 38 degrees Celsius.  *Any change in your symptoms, or any new symptoms that concern you.  Please follow up with Dr. Magallon in 1-2 weeks for outpatient work up of your symptoms, you may call [phone] to schedule an appointment.  Please follow up outpatient for your MRCP imaging.     Warning Signs:  Please call your doctor or nurse practitioner if you experience the following:  *You experience new chest pain, pressure, squeezing or tightness.  *New or worsening cough, shortness of breath, or wheeze.  *If you are vomiting and cannot keep down fluids or your medications.  *You are getting dehydrated due to continued vomiting, diarrhea, or other reasons. Signs of dehydration include dry mouth, rapid heartbeat, or feeling dizzy or faint when standing.  *You see blood or dark/black material when you vomit or have a bowel movement.  *You experience burning when you urinate, have blood in your urine, or experience a discharge.  *Your pain is not improving within 8-12 hours or is not gone within 24 hours. Call or return immediately if your pain is getting worse, changes location, or moves to your chest or back.  *You have shaking chills, or fever greater than 101.5 degrees Fahrenheit or 38 degrees Celsius.  *Any change in your symptoms, or any new symptoms that concern you.

## 2024-01-19 NOTE — PATIENT PROFILE ADULT - FALL HARM RISK - UNIVERSAL INTERVENTIONS
Bed in lowest position, wheels locked, appropriate side rails in place/Call bell, personal items and telephone in reach/Instruct patient to call for assistance before getting out of bed or chair/Non-slip footwear when patient is out of bed/[place] to call system/Physically safe environment - no spills, clutter or unnecessary equipment/Purposeful Proactive Rounding/Room/bathroom lighting operational, light cord in reach

## 2024-01-19 NOTE — DISCHARGE NOTE PROVIDER - HOSPITAL COURSE
78 year old female patient with past medical history of GERD and diverticulosis with recent diverticulitis within last year treated with antibiotics (per chart review also PSVT, hepatitis C), past surgical history of hip surgery (2 years ago s/p mechanical fall) presents to [facility] ED on 1/18 with 3-day history of worsening epigastric pain. In the ED the patient was afebrile, nontachycardic, hypertensive to 160s; On exam, abdomen is soft, nondistended, mild ttp in epigastric and RUQ without rebound or guarding; Labs showed WBC 7.88, K 3.2, T bili 1.6cm, Alk Phos 295, AST//497; CT AP demonstrates dilated CBD to 1.3cm with wall thickening; gallbladder with wall thickening and possible wall edema; probable wall thickening of cystic duct; RUQ US demonstrates dilated CBD to 11.5mm with mild smooth wall thickening up to 4mm, biliary sludge. The patient was admitted on 1/18 with concern of choledocholithiasis. On 1/19 ___    LAST UPDATED 1/19 78 year old female patient with past medical history of GERD and diverticulosis with recent diverticulitis within last year treated with antibiotics (per chart review also PSVT, hepatitis C), past surgical history of hip surgery (2 years ago s/p mechanical fall) presents to [facility] ED on 1/18 with 3-day history of worsening epigastric pain. In the ED the patient was afebrile, nontachycardic, hypertensive to 160s; On exam, abdomen is soft, nondistended, mild tenderness to palpation in epigastric and RUQ without rebound or guarding; Labs showed WBC 7.88, K 3.2, T bili 1.6cm, Alk Phos 295, AST//497; CT AP demonstrates dilated CBD to 1.3cm with wall thickening; gallbladder with wall thickening and possible wall edema; probable wall thickening of cystic duct; RUQ US demonstrates dilated CBD to 11.5mm with mild smooth wall thickening up to 4mm, biliary sludge. The patient was admitted on 1/18 with concern of choledocholithiasis. On 1/19, her morning labs were trended with improvement in her liver function. On day of discharge her pain was well controlled, she tolerated her diet, and was stable for discharge home for outpatient work up of her symptoms. 78 year old female patient with past medical history of GERD and diverticulosis with recent diverticulitis within last year treated with antibiotics (per chart review also PSVT, hepatitis C), past surgical history of hip surgery (2 years ago s/p mechanical fall) presents to [facility] ED on 1/18 with 3-day history of worsening epigastric pain. In the ED the patient was afebrile, nontachycardic, hypertensive to 160s; On exam, abdomen is soft, nondistended, mild tenderness to palpation in epigastric and RUQ without rebound or guarding; Labs showed WBC 7.88, K 3.2, T bili 1.6cm, Alk Phos 295, AST//497; CT AP demonstrates dilated CBD to 1.3cm with wall thickening; gallbladder with wall thickening and possible wall edema; probable wall thickening of cystic duct; RUQ US demonstrates dilated CBD to 11.5mm with mild smooth wall thickening up to 4mm, biliary sludge. The patient was admitted on 1/18 with concern of choledocholithiasis. On 1/19, her morning labs were trended with improvement in her liver function suggestive of having passed a stone.  Patient felt much better and due to equipment issues with inpatient MRI was not guaranteed receiving one today, we decided to discharge her for outpatient evaluation  and follow-up in the office.  She understood if anything clinically changes to contact us immediately.On day of discharge her pain was well controlled, she tolerated her diet, and was stable for discharge home for outpatient work up of her symptoms.

## 2024-01-19 NOTE — CONSULT NOTE ADULT - SUBJECTIVE AND OBJECTIVE BOX
GASTROENTEROLOGY CONSULT NOTE  HPI:  79yo Female pt with PMH GERD and diverticulosis with recent diverticulitis within last year treated with abx (per chart review also PSVT, hepatitis C), PSHx hip surgery (2 years ago s/p mechanical fall) presents to [facility] ED on 1/18 with 3-day history of worsening epigastric pain. Patient states her pain started in the epigastric area now also RUQ, cramping, associated with nausea and NBNB emesis leading to decreased PO intake. Also reports non-bloody diarrhea. Denies fevers/chills.    Last EGD 9 months ago with findings of small hiatal hernia. Last colonoscopy 2 years ago with 2 benign polyps.  Denies family hx of IBS, Crohn's, UC, or colon cancer.    In the ED, pt afebrile, nontachycardic, hypertensive to 160s. On exam, abdomen is soft, nondistended, mild ttp in epigastric and RUQ without rebound or guarding. Labs show WBC 7.88, K 3.2, T bili 1.6m Alk Phos 295, AST//497. CT AP demonstrates dilated CBD to 1.3cm with wall thickening; gallbladder with wall thickening and possible wall edema; probable wall thickening of cystic duct. RUQ US demonstrates dilated CBD to 11.5mm with mild smooth wall thickening up to 4mm, biliary sludge.     This AM lefts downtrending and vss remain stable. Her abdominal pain has significantly decreased. Currently pending MRCP.    PMH: divericulosis c/b diverticulitis, GERD  PSx: hip surgery 2 years after mechanical fall  Social Hx: non-smoker, doesn't drink, no illicit drug use  Family Hx: breast cancer in multiple cousins    Meds: pantroprazole 40mg   (18 Jan 2024 19:57)    Allergies    No Known Allergies    Intolerances      Home Medications:  pantoprazole 40 mg oral delayed release tablet: 1 tab(s) orally once a day (18 Jan 2024 19:58)    MEDICATIONS:  MEDICATIONS  (STANDING):  lactated ringers. 1000 milliLiter(s) (90 mL/Hr) IV Continuous <Continuous>  pantoprazole  Injectable 40 milliGRAM(s) IV Push with breakfast    MEDICATIONS  (PRN):  acetaminophen     Tablet .. 650 milliGRAM(s) Oral every 6 hours PRN Mild Pain (1 - 3)  ondansetron Injectable 4 milliGRAM(s) IV Push every 6 hours PRN Nausea and/or Vomiting  oxyCODONE    IR 2.5 milliGRAM(s) Oral every 6 hours PRN Moderate Pain (4 - 6)  oxyCODONE    IR 5 milliGRAM(s) Oral every 6 hours PRN Severe Pain (7 - 10)    PAST MEDICAL & SURGICAL HISTORY:  Acute diverticulitis      GERD (gastroesophageal reflux disease)      Hiatal hernia      No significant past surgical history        FAMILY HISTORY:    SOCIAL HISTORY:  Tobacco: [ ] Current, [ ] Former, [ ] Never; Pack Years:  Alcohol:  Illicit Drugs:    REVIEW OF SYSTEMS:  CONSTITUTIONAL: No weakness, fevers or chills  HEENT: No visual changes; No vertigo or throat pain   NECK: No pain or stiffness  RESPIRATORY: No cough, wheezing, hemoptysis; No shortness of breath  CARDIOVASCULAR: No chest pain or palpitations  GASTROINTESTINAL: As above.  GENITOURINARY: No dysuria, frequency or hematuria  NEUROLOGICAL: No numbness or weakness  SKIN: No itching, burning, rashes, or lesions   All other 10 review of systems is negative unless indicated above.    Vital Signs Last 24 Hrs  T(C): 36.8 (19 Jan 2024 09:01), Max: 36.9 (19 Jan 2024 00:25)  T(F): 98.3 (19 Jan 2024 09:01), Max: 98.4 (19 Jan 2024 00:25)  HR: 80 (19 Jan 2024 09:01) (73 - 101)  BP: 157/80 (19 Jan 2024 09:01) (112/74 - 157/80)  BP(mean): --  RR: 17 (19 Jan 2024 09:01) (16 - 18)  SpO2: 97% (19 Jan 2024 09:01) (96% - 99%)    Parameters below as of 19 Jan 2024 09:01  Patient On (Oxygen Delivery Method): room air        01-18 @ 07:01  -  01-19 @ 07:00  --------------------------------------------------------  IN: 1270 mL / OUT: 0 mL / NET: 1270 mL        PHYSICAL EXAM:    General: in no acute distress  Eyes: Anicteric sclerae, moist conjunctivae  HENT: Moist mucous membranes  Neck: Trachea midline, supple  Lungs: Normal respiratory effort, no intercostal retractions  Cardiovascular: RRR  Abdomen: Soft, epigastric tenderness without deluca sign, non-distended; No rebound or guarding  Extremities: Normal range of motion, No clubbing, cyanosis or edema  Neurological: Alert and oriented x3  Skin: Warm and dry. No obvious rash    LABS:                        13.9   8.36  )-----------( 259      ( 19 Jan 2024 06:21 )             42.2     01-19    137  |  103  |  8   ----------------------------<  93  4.4   |  24  |  0.81    Ca    9.2      19 Jan 2024 06:21  Phos  3.6     01-19  Mg     1.8     01-19    TPro  6.4  /  Alb  3.5  /  TBili  1.0  /  DBili  x   /  AST  157<H>  /  ALT  269<H>  /  AlkPhos  225<H>  01-19            RADIOLOGY & ADDITIONAL STUDIES:     Reviewed

## 2024-01-19 NOTE — PROGRESS NOTE ADULT - SUBJECTIVE AND OBJECTIVE BOX
1/19: home'pranay zosyn  ON: Zosyn and K repleted. Missed voids  1/18: GI consulted - make NPO @ [facility] and trend LFTs.      SUBJECTIVE: Patient seen and examined bedside by Surgical resident. Resting comfortably in bed this morning, states that her pain is well controlled, denies any n/v overnight. Denies any chest pains, SOB at this time.       MEDICATIONS  (PRN):  acetaminophen     Tablet .. 650 milliGRAM(s) Oral every 6 hours PRN Mild Pain (1 - 3)  ondansetron Injectable 4 milliGRAM(s) IV Push every 6 hours PRN Nausea and/or Vomiting  oxyCODONE    IR 2.5 milliGRAM(s) Oral every 6 hours PRN Moderate Pain (4 - 6)  oxyCODONE    IR 5 milliGRAM(s) Oral every 6 hours PRN Severe Pain (7 - 10)      I&O's Detail    18 Jan 2024 07:01  -  19 Jan 2024 07:00  --------------------------------------------------------  IN:    IV PiggyBack: 400 mL    IV PiggyBack: 150 mL    Lactated Ringers: 720 mL  Total IN: 1270 mL    OUT:  Total OUT: 0 mL    Total NET: 1270 mL          Vital Signs Last 24 Hrs  T(C): 36.9 (19 Jan 2024 05:20), Max: 36.9 (19 Jan 2024 00:25)  T(F): 98.4 (19 Jan 2024 05:20), Max: 98.4 (19 Jan 2024 00:25)  HR: 91 (19 Jan 2024 05:20) (73 - 101)  BP: 113/64 (19 Jan 2024 05:20) (112/74 - 169/90)  BP(mean): --  RR: 18 (19 Jan 2024 05:20) (16 - 18)  SpO2: 96% (19 Jan 2024 05:20) (96% - 99%)    Parameters below as of 19 Jan 2024 05:20  Patient On (Oxygen Delivery Method): room air        General: NAD, resting comfortably in bed  C/V: NSR  Pulm: Nonlabored breathing, no respiratory distress  Abd: soft, mild TTP in RUQ to deep palpation/ND  Extrem: WWP, no edema, SCDs in place    LABS:                        13.9   8.36  )-----------( 259      ( 19 Jan 2024 06:21 )             42.2     01-19    137  |  103  |  8   ----------------------------<  93  4.4   |  24  |  0.81    Ca    9.2      19 Jan 2024 06:21  Phos  3.6     01-19  Mg     1.8     01-19    TPro  6.4  /  Alb  3.5  /  TBili  1.0  /  DBili  x   /  AST  157<H>  /  ALT  269<H>  /  AlkPhos  225<H>  01-19      Urinalysis Basic - ( 19 Jan 2024 06:21 )    Color: x / Appearance: x / SG: x / pH: x  Gluc: 93 mg/dL / Ketone: x  / Bili: x / Urobili: x   Blood: x / Protein: x / Nitrite: x   Leuk Esterase: x / RBC: x / WBC x   Sq Epi: x / Non Sq Epi: x / Bacteria: x        RADIOLOGY & ADDITIONAL STUDIES:  CT Abdomen and Pelvis w/ IV Cont:   ACC: [id number] EXAM:  CT ABDOMEN AND PELVIS IC   ORDERED BY: ABIODUN LOPEZ     PROCEDURE DATE:  01/18/2024          INTERPRETATION:  CLINICAL INFORMATION: 78-year-old female. Left lower   quadrant pain.    COMPARISON: None.    CONTRAST/COMPLICATIONS:  IV Contrast: Omnipaque 350  96 cc administered .  Oral Contrast: None  Complications: None    PROCEDURE:  CT of the Abdomen and Pelvis was performed.  Sagittal and coronal reformats were performed.    FINDINGS:  LOWER CHEST: Suggestion of a small hiatal hernia..    LIVER: Nonenlarged..  BILE DUCTS: No intrahepatic biliary dilatation. The common bile duct is   dilated up to at least 1.3 cm. Wall thickening of the CBD.  GALLBLADDER: No radiopaque gallstones. The gallbladder is elongated with   diameter up to 3.6 cm and shows mild wall thickening. Possible   gallbladder wall edema. Probable wall thickening of cystic duct.  SPLEEN: Within normal limits.  PANCREAS: Within normal limits. The main pancreatic duct drains via the   major papilla.  ADRENALS: Within normal limits.  KIDNEYS/URETERS: Within normal limits.    BLADDER: Within normal limits.  REPRODUCTIVE ORGANS: Enlarged calcified multi fibroid uterus. The uterus   measures 8.6 x 9.8 x 8.3 cm. Unremarkable ovaries.    BOWEL: Limited evaluation of bowel due to lack of oral contrast material.   No bowel obstruction or ileus. Colon loaded with fecal material.   Scattered colonic diverticula predominantly involving sigmoid.. No   evidence of colonic diverticulitis or colitis. Normal appendix.  PERITONEUM: No ascites.  VESSELS: Within normal limits.  RETROPERITONEUM/LYMPH NODES: No lymphadenopathy.  ABDOMINAL WALL: Within normal limits.  BONES: Osteitis pubis. Three left proximal femoral Jin pins.   Degenerative disc diseaseL2-L3 and L5-S1. Lumbar levoscoliosis.    IMPRESSION:  No evidence of colonic diverticulitis or colitis.  Normal appendix.  Dilated thick walled CBD. Findings can be correlated with MR/MRCP and   abdominal ultrasound.        --- End of Report ---            AVIVA MELGAR MD; Attending Radiologist  This document has been electronically signed. Jan 18 2024 11:33AM (01-18-24 @ 11:22)

## 2024-01-20 ENCOUNTER — RESULT REVIEW (OUTPATIENT)
Age: 79
End: 2024-01-20

## 2024-01-20 ENCOUNTER — OUTPATIENT (OUTPATIENT)
Dept: OUTPATIENT SERVICES | Facility: HOSPITAL | Age: 79
LOS: 1 days | End: 2024-01-20

## 2024-01-20 ENCOUNTER — APPOINTMENT (OUTPATIENT)
Dept: MRI IMAGING | Facility: CLINIC | Age: 79
End: 2024-01-20
Payer: MEDICARE

## 2024-01-20 PROBLEM — K21.9 GASTRO-ESOPHAGEAL REFLUX DISEASE WITHOUT ESOPHAGITIS: Chronic | Status: ACTIVE | Noted: 2024-01-18

## 2024-01-20 PROBLEM — K44.9 DIAPHRAGMATIC HERNIA WITHOUT OBSTRUCTION OR GANGRENE: Chronic | Status: ACTIVE | Noted: 2024-01-18

## 2024-01-20 PROCEDURE — 74183 MRI ABD W/O CNTR FLWD CNTR: CPT | Mod: 26,MH

## 2024-01-23 ENCOUNTER — APPOINTMENT (OUTPATIENT)
Dept: SURGICAL ONCOLOGY | Facility: CLINIC | Age: 79
End: 2024-01-23
Payer: MEDICARE

## 2024-01-23 ENCOUNTER — TRANSCRIPTION ENCOUNTER (OUTPATIENT)
Age: 79
End: 2024-01-23

## 2024-01-23 VITALS
HEART RATE: 86 BPM | TEMPERATURE: 98 F | SYSTOLIC BLOOD PRESSURE: 136 MMHG | BODY MASS INDEX: 19.83 KG/M2 | WEIGHT: 119 LBS | OXYGEN SATURATION: 97 % | DIASTOLIC BLOOD PRESSURE: 73 MMHG | HEIGHT: 65 IN

## 2024-01-23 DIAGNOSIS — K81.0 ACUTE CHOLECYSTITIS: ICD-10-CM

## 2024-01-23 LAB
CULTURE RESULTS: SIGNIFICANT CHANGE UP
CULTURE RESULTS: SIGNIFICANT CHANGE UP
SPECIMEN SOURCE: SIGNIFICANT CHANGE UP
SPECIMEN SOURCE: SIGNIFICANT CHANGE UP

## 2024-01-23 PROCEDURE — 99204 OFFICE O/P NEW MOD 45 MIN: CPT

## 2024-01-23 NOTE — PHYSICAL EXAM
[Normal] : oriented to person, place and time, with appropriate affect [de-identified] : Soft, no peritoneal signs

## 2024-01-23 NOTE — HISTORY OF PRESENT ILLNESS
[de-identified] : Patient Name: Mely Garcia MRN: [id number] Kesha MRN: [id number] Referring Provider: None Date: 1/23/24 Diagnosis: Acute cholecystitis   78-year-old female with past medical history of GERD and diverticulosis with recent diverticulitis within last year treated with antibiotics (per chart review also PSVT, hepatitis C), past surgical history of hip surgery (2 years ago s/p mechanical fall) presented to [facility] ED on 1/18 with 3-day history of worsening epigastric pain.  Work up: 1/18/24- CTAP: No evidence of colonic diverticulitis or colitis. Normal appendix. Dilated up at least 1.3 cm thick-walled CBD. Finding can be correlated with MR/MRCP and abdominal US.   1/18/24- RUQ US demonstrates dilated CBD to 11.5 mm with mild smooth wall thickening up to 4mm, no pericholecystic fluid. Biliary sludge. No shadowing gallstones or sonographic evidence of choledocholithiasis. No intrahepatic duct dilatation.   1/18/24- Labs: T bili 1.6cm, Alk Phos 295, AST//497.  1/19/24- Labs: T bili 1.0, ALk Phos 225, AST//269.   1/20/24- MR MRCP:  1. No choledocholithiasis is seen. 2. Moderate amount of sludge in distended gallbladder, with gallbladder wall thickening and hyperemia and pericholecystic fluid, consistent with acute cholecystitis. Hyperenhancement of the liver parenchyma adjacent to the gallbladder fossa is suspicious for gangrenous cholecystitis. 3. Hyperenhancement of the walls of the common hepatic duct and common bile duct, suspicious for cholangitis. 4. Low insertion of cystic duct on common hepatic duct, 2.3 cm proximal to the major papilla.  Patient is here to review the MR MRCP report and discuss next step. Today she is feeling well; states that she no longer has abdominal pain; denies changes in bowel habits, appetite, weight, denies nausea/vomiting.

## 2024-01-23 NOTE — ASSESSMENT
[FreeTextEntry1] : Impression) cholecystitis possibly gangrenous  Plan) Long discussion with the patient and family about clinical findings.  Clinically I believe that she may have passed a stone based on her LFTs.  The recent MRI clearly shows evidence of cholecystitis possibly gangrenous cholecystitis.  I advised that she have a cholecystectomy despite the fact that clinically right now she feels well.  She states that at this time she is not ready to have it for personal reasons although sometime the next couple of weeks she will likely be ready to have it done.  We discussed contacting us immediately should she have any change in her symptoms or develop fevers.  We discussed the nature of a laparoscopic cholecystectomy risks and benefits which include but not limited to postoperative bleeding, infection, biliary injury, or need to convert to open surgery if significant inflammatory changes.  All questions answered.  Danielito Magallon MD  Chief Surgical Oncology Multidisciplinary GI cancer program [facility]  Professor Surgery [facility] CC Dr. Leidy Montemayor

## 2024-01-24 ENCOUNTER — TRANSCRIPTION ENCOUNTER (OUTPATIENT)
Age: 79
End: 2024-01-24

## 2024-01-25 ENCOUNTER — RESULT REVIEW (OUTPATIENT)
Age: 79
End: 2024-01-25

## 2024-01-25 ENCOUNTER — TRANSCRIPTION ENCOUNTER (OUTPATIENT)
Age: 79
End: 2024-01-25

## 2024-01-25 ENCOUNTER — INPATIENT (INPATIENT)
Facility: HOSPITAL | Age: 79
LOS: 0 days | Discharge: HOME HEALTH | DRG: 419 | End: 2024-01-26
Attending: SURGERY | Admitting: SURGERY
Payer: COMMERCIAL

## 2024-01-25 VITALS
WEIGHT: 119.05 LBS | RESPIRATION RATE: 18 BRPM | TEMPERATURE: 98 F | HEART RATE: 71 BPM | HEIGHT: 65 IN | OXYGEN SATURATION: 97 % | SYSTOLIC BLOOD PRESSURE: 156 MMHG | DIASTOLIC BLOOD PRESSURE: 86 MMHG

## 2024-01-25 LAB
ALBUMIN SERPL ELPH-MCNC: 4.2 G/DL — SIGNIFICANT CHANGE UP (ref 3.3–5)
ALP SERPL-CCNC: 366 U/L — HIGH (ref 40–120)
ALT FLD-CCNC: 267 U/L — HIGH (ref 10–45)
ANION GAP SERPL CALC-SCNC: 13 MMOL/L — SIGNIFICANT CHANGE UP (ref 5–17)
APPEARANCE UR: CLEAR — SIGNIFICANT CHANGE UP
APTT BLD: 34.3 SEC — SIGNIFICANT CHANGE UP (ref 24.5–35.6)
AST SERPL-CCNC: 307 U/L — HIGH (ref 10–40)
BASOPHILS # BLD AUTO: 0.08 K/UL — SIGNIFICANT CHANGE UP (ref 0–0.2)
BASOPHILS NFR BLD AUTO: 1 % — SIGNIFICANT CHANGE UP (ref 0–2)
BILIRUB SERPL-MCNC: 1.3 MG/DL — HIGH (ref 0.2–1.2)
BILIRUB UR-MCNC: NEGATIVE — SIGNIFICANT CHANGE UP
BLD GP AB SCN SERPL QL: NEGATIVE — SIGNIFICANT CHANGE UP
BUN SERPL-MCNC: 11 MG/DL — SIGNIFICANT CHANGE UP (ref 7–23)
CALCIUM SERPL-MCNC: 9.3 MG/DL — SIGNIFICANT CHANGE UP (ref 8.4–10.5)
CHLORIDE SERPL-SCNC: 99 MMOL/L — SIGNIFICANT CHANGE UP (ref 96–108)
CO2 SERPL-SCNC: 26 MMOL/L — SIGNIFICANT CHANGE UP (ref 22–31)
COLOR SPEC: YELLOW — SIGNIFICANT CHANGE UP
CREAT SERPL-MCNC: 0.71 MG/DL — SIGNIFICANT CHANGE UP (ref 0.5–1.3)
DIFF PNL FLD: NEGATIVE — SIGNIFICANT CHANGE UP
EGFR: 88 ML/MIN/1.73M2 — SIGNIFICANT CHANGE UP
EOSINOPHIL # BLD AUTO: 0.56 K/UL — HIGH (ref 0–0.5)
EOSINOPHIL NFR BLD AUTO: 7.2 % — HIGH (ref 0–6)
GLUCOSE SERPL-MCNC: 110 MG/DL — HIGH (ref 70–99)
GLUCOSE UR QL: NEGATIVE MG/DL — SIGNIFICANT CHANGE UP
HCT VFR BLD CALC: 41.6 % — SIGNIFICANT CHANGE UP (ref 34.5–45)
HGB BLD-MCNC: 14 G/DL — SIGNIFICANT CHANGE UP (ref 11.5–15.5)
IMM GRANULOCYTES NFR BLD AUTO: 0.3 % — SIGNIFICANT CHANGE UP (ref 0–0.9)
INR BLD: 1.03 — SIGNIFICANT CHANGE UP (ref 0.85–1.18)
KETONES UR-MCNC: NEGATIVE MG/DL — SIGNIFICANT CHANGE UP
LEUKOCYTE ESTERASE UR-ACNC: NEGATIVE — SIGNIFICANT CHANGE UP
LIDOCAIN IGE QN: 22 U/L — SIGNIFICANT CHANGE UP (ref 7–60)
LYMPHOCYTES # BLD AUTO: 0.97 K/UL — LOW (ref 1–3.3)
LYMPHOCYTES # BLD AUTO: 12.5 % — LOW (ref 13–44)
MCHC RBC-ENTMCNC: 30.6 PG — SIGNIFICANT CHANGE UP (ref 27–34)
MCHC RBC-ENTMCNC: 33.7 GM/DL — SIGNIFICANT CHANGE UP (ref 32–36)
MCV RBC AUTO: 91 FL — SIGNIFICANT CHANGE UP (ref 80–100)
MONOCYTES # BLD AUTO: 0.63 K/UL — SIGNIFICANT CHANGE UP (ref 0–0.9)
MONOCYTES NFR BLD AUTO: 8.1 % — SIGNIFICANT CHANGE UP (ref 2–14)
NEUTROPHILS # BLD AUTO: 5.48 K/UL — SIGNIFICANT CHANGE UP (ref 1.8–7.4)
NEUTROPHILS NFR BLD AUTO: 70.9 % — SIGNIFICANT CHANGE UP (ref 43–77)
NITRITE UR-MCNC: NEGATIVE — SIGNIFICANT CHANGE UP
NRBC # BLD: 0 /100 WBCS — SIGNIFICANT CHANGE UP (ref 0–0)
PH UR: 7.5 — SIGNIFICANT CHANGE UP (ref 5–8)
PLATELET # BLD AUTO: 278 K/UL — SIGNIFICANT CHANGE UP (ref 150–400)
POTASSIUM SERPL-MCNC: 4.2 MMOL/L — SIGNIFICANT CHANGE UP (ref 3.5–5.3)
POTASSIUM SERPL-SCNC: 4.2 MMOL/L — SIGNIFICANT CHANGE UP (ref 3.5–5.3)
PROT SERPL-MCNC: 7.2 G/DL — SIGNIFICANT CHANGE UP (ref 6–8.3)
PROT UR-MCNC: NEGATIVE MG/DL — SIGNIFICANT CHANGE UP
PROTHROM AB SERPL-ACNC: 11.7 SEC — SIGNIFICANT CHANGE UP (ref 9.5–13)
RBC # BLD: 4.57 M/UL — SIGNIFICANT CHANGE UP (ref 3.8–5.2)
RBC # FLD: 13.2 % — SIGNIFICANT CHANGE UP (ref 10.3–14.5)
RH IG SCN BLD-IMP: POSITIVE — SIGNIFICANT CHANGE UP
SODIUM SERPL-SCNC: 138 MMOL/L — SIGNIFICANT CHANGE UP (ref 135–145)
SP GR SPEC: <1.005 — LOW (ref 1–1.03)
UROBILINOGEN FLD QL: 0.2 MG/DL — SIGNIFICANT CHANGE UP (ref 0.2–1)
WBC # BLD: 7.74 K/UL — SIGNIFICANT CHANGE UP (ref 3.8–10.5)
WBC # FLD AUTO: 7.74 K/UL — SIGNIFICANT CHANGE UP (ref 3.8–10.5)

## 2024-01-25 PROCEDURE — 47562 LAPAROSCOPIC CHOLECYSTECTOMY: CPT

## 2024-01-25 PROCEDURE — 99285 EMERGENCY DEPT VISIT HI MDM: CPT

## 2024-01-25 PROCEDURE — 88304 TISSUE EXAM BY PATHOLOGIST: CPT | Mod: 26

## 2024-01-25 PROCEDURE — 93010 ELECTROCARDIOGRAM REPORT: CPT

## 2024-01-25 DEVICE — CHOLANGIOGRAM CATH TELEFLEX 4.5FR X 18": Type: IMPLANTABLE DEVICE | Status: FUNCTIONAL

## 2024-01-25 RX ORDER — HEPARIN SODIUM 5000 [USP'U]/ML
5000 INJECTION INTRAVENOUS; SUBCUTANEOUS EVERY 8 HOURS
Refills: 0 | Status: DISCONTINUED | OUTPATIENT
Start: 2024-01-25 | End: 2024-01-25

## 2024-01-25 RX ORDER — SODIUM CHLORIDE 9 MG/ML
1000 INJECTION INTRAMUSCULAR; INTRAVENOUS; SUBCUTANEOUS ONCE
Refills: 0 | Status: COMPLETED | OUTPATIENT
Start: 2024-01-25 | End: 2024-01-25

## 2024-01-25 RX ORDER — CEFTRIAXONE 500 MG/1
1000 INJECTION, POWDER, FOR SOLUTION INTRAMUSCULAR; INTRAVENOUS EVERY 24 HOURS
Refills: 0 | Status: DISCONTINUED | OUTPATIENT
Start: 2024-01-25 | End: 2024-01-25

## 2024-01-25 RX ORDER — HYDROMORPHONE HYDROCHLORIDE 2 MG/ML
0.5 INJECTION INTRAMUSCULAR; INTRAVENOUS; SUBCUTANEOUS
Refills: 0 | Status: DISCONTINUED | OUTPATIENT
Start: 2024-01-25 | End: 2024-01-25

## 2024-01-25 RX ORDER — METRONIDAZOLE 500 MG
500 TABLET ORAL ONCE
Refills: 0 | Status: DISCONTINUED | OUTPATIENT
Start: 2024-01-25 | End: 2024-01-25

## 2024-01-25 RX ORDER — SODIUM CHLORIDE 9 MG/ML
1000 INJECTION, SOLUTION INTRAVENOUS
Refills: 0 | Status: DISCONTINUED | OUTPATIENT
Start: 2024-01-25 | End: 2024-01-25

## 2024-01-25 RX ORDER — ONDANSETRON 8 MG/1
4 TABLET, FILM COATED ORAL EVERY 6 HOURS
Refills: 0 | Status: DISCONTINUED | OUTPATIENT
Start: 2024-01-25 | End: 2024-01-26

## 2024-01-25 RX ORDER — CEFTRIAXONE 500 MG/1
1000 INJECTION, POWDER, FOR SOLUTION INTRAMUSCULAR; INTRAVENOUS ONCE
Refills: 0 | Status: DISCONTINUED | OUTPATIENT
Start: 2024-01-25 | End: 2024-01-25

## 2024-01-25 RX ORDER — OXYCODONE HYDROCHLORIDE 5 MG/1
2.5 TABLET ORAL EVERY 6 HOURS
Refills: 0 | Status: DISCONTINUED | OUTPATIENT
Start: 2024-01-25 | End: 2024-01-26

## 2024-01-25 RX ORDER — HEPARIN SODIUM 5000 [USP'U]/ML
5000 INJECTION INTRAVENOUS; SUBCUTANEOUS EVERY 8 HOURS
Refills: 0 | Status: DISCONTINUED | OUTPATIENT
Start: 2024-01-25 | End: 2024-01-26

## 2024-01-25 RX ORDER — PANTOPRAZOLE SODIUM 20 MG/1
40 TABLET, DELAYED RELEASE ORAL EVERY 24 HOURS
Refills: 0 | Status: DISCONTINUED | OUTPATIENT
Start: 2024-01-25 | End: 2024-01-25

## 2024-01-25 RX ORDER — SODIUM CHLORIDE 9 MG/ML
1000 INJECTION, SOLUTION INTRAVENOUS
Refills: 0 | Status: DISCONTINUED | OUTPATIENT
Start: 2024-01-25 | End: 2024-01-26

## 2024-01-25 RX ORDER — OXYCODONE HYDROCHLORIDE 5 MG/1
5 TABLET ORAL EVERY 6 HOURS
Refills: 0 | Status: DISCONTINUED | OUTPATIENT
Start: 2024-01-25 | End: 2024-01-26

## 2024-01-25 RX ORDER — INDOCYANINE GREEN 25 MG
2 KIT INTRAVASCULAR; INTRAVENOUS ONCE
Refills: 0 | Status: DISCONTINUED | OUTPATIENT
Start: 2024-01-25 | End: 2024-01-26

## 2024-01-25 RX ORDER — METRONIDAZOLE 500 MG
500 TABLET ORAL EVERY 8 HOURS
Refills: 0 | Status: DISCONTINUED | OUTPATIENT
Start: 2024-01-25 | End: 2024-01-25

## 2024-01-25 RX ORDER — PANTOPRAZOLE SODIUM 20 MG/1
40 TABLET, DELAYED RELEASE ORAL EVERY 24 HOURS
Refills: 0 | Status: DISCONTINUED | OUTPATIENT
Start: 2024-01-25 | End: 2024-01-26

## 2024-01-25 RX ORDER — PANTOPRAZOLE SODIUM 20 MG/1
1 TABLET, DELAYED RELEASE ORAL
Refills: 0 | DISCHARGE

## 2024-01-25 RX ORDER — MORPHINE SULFATE 50 MG/1
4 CAPSULE, EXTENDED RELEASE ORAL ONCE
Refills: 0 | Status: DISCONTINUED | OUTPATIENT
Start: 2024-01-25 | End: 2024-01-25

## 2024-01-25 RX ORDER — BENZOCAINE AND MENTHOL 5; 1 G/100ML; G/100ML
1 LIQUID ORAL
Refills: 0 | Status: DISCONTINUED | OUTPATIENT
Start: 2024-01-25 | End: 2024-01-26

## 2024-01-25 RX ADMIN — SODIUM CHLORIDE 90 MILLILITER(S): 9 INJECTION, SOLUTION INTRAVENOUS at 19:01

## 2024-01-25 RX ADMIN — MORPHINE SULFATE 4 MILLIGRAM(S): 50 CAPSULE, EXTENDED RELEASE ORAL at 12:10

## 2024-01-25 RX ADMIN — HYDROMORPHONE HYDROCHLORIDE 0.5 MILLIGRAM(S): 2 INJECTION INTRAMUSCULAR; INTRAVENOUS; SUBCUTANEOUS at 18:55

## 2024-01-25 RX ADMIN — HYDROMORPHONE HYDROCHLORIDE 0.5 MILLIGRAM(S): 2 INJECTION INTRAMUSCULAR; INTRAVENOUS; SUBCUTANEOUS at 19:10

## 2024-01-25 RX ADMIN — HEPARIN SODIUM 5000 UNIT(S): 5000 INJECTION INTRAVENOUS; SUBCUTANEOUS at 22:07

## 2024-01-25 RX ADMIN — PANTOPRAZOLE SODIUM 40 MILLIGRAM(S): 20 TABLET, DELAYED RELEASE ORAL at 14:01

## 2024-01-25 RX ADMIN — HYDROMORPHONE HYDROCHLORIDE 0.5 MILLIGRAM(S): 2 INJECTION INTRAMUSCULAR; INTRAVENOUS; SUBCUTANEOUS at 19:50

## 2024-01-25 RX ADMIN — HYDROMORPHONE HYDROCHLORIDE 0.5 MILLIGRAM(S): 2 INJECTION INTRAMUSCULAR; INTRAVENOUS; SUBCUTANEOUS at 19:39

## 2024-01-25 RX ADMIN — BENZOCAINE AND MENTHOL 1 LOZENGE: 5; 1 LIQUID ORAL at 22:08

## 2024-01-25 RX ADMIN — MORPHINE SULFATE 4 MILLIGRAM(S): 50 CAPSULE, EXTENDED RELEASE ORAL at 11:07

## 2024-01-25 RX ADMIN — SODIUM CHLORIDE 1000 MILLILITER(S): 9 INJECTION INTRAMUSCULAR; INTRAVENOUS; SUBCUTANEOUS at 11:07

## 2024-01-25 RX ADMIN — Medication 100 MILLIGRAM(S): at 14:14

## 2024-01-25 RX ADMIN — PANTOPRAZOLE SODIUM 40 MILLIGRAM(S): 20 TABLET, DELAYED RELEASE ORAL at 19:39

## 2024-01-25 RX ADMIN — SODIUM CHLORIDE 40 MILLILITER(S): 9 INJECTION, SOLUTION INTRAVENOUS at 22:07

## 2024-01-25 RX ADMIN — CEFTRIAXONE 100 MILLIGRAM(S): 500 INJECTION, POWDER, FOR SOLUTION INTRAMUSCULAR; INTRAVENOUS at 13:41

## 2024-01-25 NOTE — BRIEF OPERATIVE NOTE - OPERATION/FINDINGS
Procedure: Laparoscopic Cholecystectomy     Access via Jaimie cutdown. GB identified, inflamed and edematous, decompressed. Cystic artery and duct skeletonized. Artery divided. Ductotomy performed w/ attempted insertion of Cholangiogram catheter however duct noted to be obliterated. GB freed from cystic plate top down, duct encircled w/ Vicryl Endoloop and divided. GB placed in endocatch bag. Hemostasis ensured. Fascia closed w/ Vicryl and skin w/ Monocryl.

## 2024-01-25 NOTE — PROGRESS NOTE ADULT - SUBJECTIVE AND OBJECTIVE BOX
Procedure: lap cholecystectomy  Surgeon: Dr. Magallon    S: Pt has no complaints, tolerating diet w/o nausea or vomiting. Pain controlled with medication. Denies CP, SOB, REARDON, calf tenderness.     O:  T(C): 36.8 (01-25-24 @ 21:00), Max: 36.8 (01-25-24 @ 21:00)  T(F): 98.3 (01-25-24 @ 21:00), Max: 98.3 (01-25-24 @ 21:00)  HR: 85 (01-25-24 @ 21:00) (74 - 91)  BP: 138/74 (01-25-24 @ 21:00) (138/74 - 174/81)  RR: 18 (01-25-24 @ 21:00) (14 - 23)  SpO2: 94% (01-25-24 @ 21:00) (94% - 100%)  Wt(kg): --                        14.0   7.74  )-----------( 278      ( 25 Jan 2024 09:59 )             41.6     01-25    138  |  99  |  11  ----------------------------<  110<H>  4.2   |  26  |  0.71    Ca    9.3      25 Jan 2024 09:59    TPro  7.2  /  Alb  4.2  /  TBili  1.3<H>  /  DBili  x   /  AST  307<H>  /  ALT  267<H>  /  AlkPhos  366<H>  01-25      Gen: NAD, resting comfortably in bed  C/V: NSR  Pulm: Nonlabored breathing, no respiratory distress  Abd: soft, ND. appropriately ttp around incisions. No rebound or guarding.  Incision: C/D/I  Extrem: WWP, no calf edema, SCDs in place

## 2024-01-25 NOTE — ED ADULT NURSE NOTE - OBJECTIVE STATEMENT
78yF pmhx cholecystitis presents to ER complaining of abdominal pain that started at 5am this morning. Pt was scheduled for a lap leon this week had to reschedule. Complaining of diffuse abdominal pain with diarrhea and nausea. "I feel like my stomach is blown up waiting to pop". Describes pain at aching and 7/10., non-radiating. States she is unable to take a full breath in d/t pain leading to some feelings of SOB. Pt took a pain medication this morning prescribed by her MD with no relief. Denies Vomitting. Denies F/C, CP.

## 2024-01-25 NOTE — H&P ADULT - NSHPPHYSICALEXAM_GEN_ALL_CORE
Vital Signs Last 24 Hrs  T(C): 36.5 (25 Jan 2024 12:08), Max: 36.6 (25 Jan 2024 09:32)  T(F): 97.7 (25 Jan 2024 12:08), Max: 97.8 (25 Jan 2024 09:32)  HR: 70 (25 Jan 2024 12:08) (70 - 71)  BP: 126/68 (25 Jan 2024 12:08) (126/68 - 156/86)  BP(mean): --  RR: 18 (25 Jan 2024 12:08) (18 - 18)  SpO2: 98% (25 Jan 2024 12:08) (97% - 98%)    Parameters below as of 25 Jan 2024 12:08  Patient On (Oxygen Delivery Method): room air    General: NAD, resting comfortably in bed.  C/V: NSR.  Pulm: Nonlabored breathing, no respiratory distress on RA.  Abd: soft, nondistended, moderate RUQ ttp with +Rock.  Extrem: WWP, no edema, SCDs in place.  Neuro: motor/sensory grossly intact, moves all ext . to command and spontaneously.

## 2024-01-25 NOTE — ED PROVIDER NOTE - CLINICAL SUMMARY MEDICAL DECISION MAKING FREE TEXT BOX
Impression: Pt is a 77yo f, h/o GERD, diverticulosis, with admission last week for choledocholithiasis, thought to have passed a stone, who presents today w/ recurrent r sided abd pain last night, described as pressure and aching, constant, better when lying supine. No n/v, f/c. + soft stools, no blood. + abd distention. No urinary sx's. Denies cp, sob. Afebrile. HDS. Abd soft, with mild r sided and periumbilical tenderness. Labs reviewed w/ findings of normal wbc, elev tb, alk phos and transminases. Pt evaluated by Dr. Magallon and surg residents; ? retained cbd stone/ cholecystitis. Will obtain ruq sono, give pre-emptive abx, admit to surg svc for further work up and management.

## 2024-01-25 NOTE — H&P ADULT - NSHPLABSRESULTS_GEN_ALL_CORE
14.0   7.74  )-----------( 278      ( 25 Jan 2024 09:59 )             41.6   01-25    138  |  99  |  11  ----------------------------<  110<H>  4.2   |  26  |  0.71    Ca    9.3      25 Jan 2024 09:59    TPro  7.2  /  Alb  4.2  /  TBili  1.3<H>  /  DBili  x   /  AST  307<H>  /  ALT  267<H>  /  AlkPhos  366<H>  01-25        INTERPRETATION:  CLINICAL INFORMATION: Cholecystolithiasis. Evaluate for   common bile duct stones prior to cholecystectomy.    COMPARISON: Right upper quadrant ultrasound and CT scan of abdomen and   pelvis, both from 1/18/2024.    CONTRAST/COMPLICATIONS:  IV Contrast: Gadavist  6 cc administered   1.5 cc discarded  Oral Contrast: NONE  Complications: None reported at time of study completion    PROCEDURE:  MRI of the abdomen was performed.  MRCP was performed.    FINDINGS:  LOWER CHEST: Within normal limits.    LIVER: There is heterogeneous hepatic enhancement during the arterial   phase, most pronounced in segments IVb and V adjacent to the gallbladder   fossa, where there is marked hyperenhancement. There is homogeneous   enhancement on delayed postcontrast images. No focal lesion is seen.  BILE DUCTS: No intrahepatic biliary ductal dilatation. Hyperenhancement   of the walls of the common hepatic duct and common bile duct. Both the   common hepatic duct and common bile duct are mildly dilated, measuring   0.9 cm. No choledocholithiasis is seen. Of note, there is a low insertion   of the cystic duct on the common hepatic duct, 2.3 cm proximal to the   major papilla.  GALLBLADDER: Moderate amount of sludge in distended gallbladder. No   definite gallstones are seen. The gallbladder wall is mildly thickened   and hyperenhancing. Pericholecystic fluid is present.  SPLEEN: Within normal limits.  PANCREAS: Within normal limits.  ADRENALS: Within normal limits.  KIDNEYS/URETERS: Within normal limits.    VISUALIZED PORTIONS:  BOWEL: Within normal limits.  PERITONEUM: Pericholecystic fluid.  VESSELS: Within normal limits.  RETROPERITONEUM/LYMPH NODES: No lymphadenopathy.  ABDOMINAL WALL: Within normal limits.  BONES: Degenerative changes.    IMPRESSION:  1. No choledocholithiasis is seen.    2. Moderate amount of sludge in distended gallbladder, with gallbladder   wall thickening and hyperemia and pericholecystic fluid, consistent with   acute cholecystitis. Hyperenhancement of the liver parenchyma adjacent to   the gallbladder fossa is suspicious for gangrenous cholecystitis.    3. Hyperenhancement of the walls of the common hepatic duct and common   bile duct, suspicious for cholangitis.    4. Low insertion of cystic duct on common hepatic duct, 2.3 cm proximal   to the major papilla.

## 2024-01-25 NOTE — ED ADULT NURSE NOTE - NSFALLUNIVINTERV_ED_ALL_ED
Bed/Stretcher in lowest position, wheels locked, appropriate side rails in place/Call bell, personal items and telephone in reach/Instruct patient to call for assistance before getting out of bed/chair/stretcher/Non-slip footwear applied when patient is off stretcher/[place] to call system/Physically safe environment - no spills, clutter or unnecessary equipment/Purposeful proactive rounding/Room/bathroom lighting operational, light cord in reach

## 2024-01-25 NOTE — ED PROVIDER NOTE - PHYSICAL EXAMINATION
VITAL SIGNS: I have reviewed nursing notes and confirm.  CONSTITUTIONAL: Well appearing, in no acute distress.   SKIN:  warm and dry, no acute rash.   HEAD:  normocephalic, atraumatic.  EYES: EOM intact; conjunctiva and sclera clear.  ENT: No nasal discharge; airway clear.   NECK: Supple.  CARD: S1, S2, Regular rate and rhythm.   RESP:  Clear to auscultation b/l, no wheezes, rales or rhonchi.  ABD: Normal bowel sounds; soft; + distended; + mild periumbilical/ r sided abd tenderness; no guarding/ rebound.  EXT: Normal ROM. No peripheral edema. Pulses intact and equal b/l.  NEURO: Alert, oriented, grossly unremarkable

## 2024-01-25 NOTE — PRE-ANESTHESIA EVALUATION ADULT - NSANTHOSAYNRD_GEN_A_CORE
No. GERRI screening performed.  STOP BANG Legend: 0-2 = LOW Risk; 3-4 = INTERMEDIATE Risk; 5-8 = HIGH Risk

## 2024-01-25 NOTE — H&P ADULT - HISTORY OF PRESENT ILLNESS
79yo Female pt with PMH GERD and diverticulosis with recent diverticulitis within last year treated with abx (per chart review also PSVT, hepatitis C), PSHx hip surgery (2 years ago s/p mechanical fall) recebtly admitted on 1/18 for concerns of choledocolithiasis with concomittant acute cholecystitis now presents with worsening RUQ pain. Described cramping RUQ pain however denies n/v, cp, sob.    Last EGD 9 months ago with findings of small hiatal hernia. Last colonoscopy 2 years ago with 2 benign polyps.  Denies family hx of IBS, Crohn's, UC, or colon cancer.    In the ED, pt afebrile, nontachycardic, normotensive. On exam, abdomen is soft, nondistended, moderately ttp in RUQ with +Rock. WBC 7.74, T bili 1.3, AST//267, Alk Phos 366. Labs show WBC . MRCP 1/20 demonstrates moderate amount of sludge in distended gallbladder, with gallbladder wall thickening and hyperemia and pericholecystic fluid, consistent with acute cholecystitis; hyperenhancement of the liver parenchyma adjacent to the gallbladder fossa is suspicious for gangrenous cholecystitis.; hyperenhancement of the walls of the common hepatic duct and common bile duct, suspicious for cholangitis.    PMH: divericulosis c/b diverticulitis, GERD  PSx: hip surgery 2 years after mechanical fall  Social Hx: non-smoker, doesn't drink, no illicit drug use  Family Hx: breast cancer in multiple cousins    Meds: pantroprazole 40mg

## 2024-01-25 NOTE — H&P ADULT - ASSESSMENT
77yo Female pt with PMH GERD and diverticulosis with recent diverticulitis within last year treated with abx (per chart review also PSVT, hepatitis C), PSHx hip surgery (2 years ago s/p mechanical fall) recebtly admitted on 1/18 for concerns of choledocolithiasis with concomittant acute cholecystitis now presents with worsening RUQ pain. Afebrile, nontachycardic, normotensive. On exam, abdomen is soft, nondistended, moderately ttp in RUQ with +Rock. WBC 7.74, T bili 1.3, AST//267, Alk Phos 366. Labs show WBC . MRCP 1/20 demonstrates moderate amount of sludge in distended gallbladder, with gallbladder wall thickening and hyperemia and pericholecystic fluid, consistent with acute cholecystitis; hyperenhancement of the liver parenchyma adjacent to the gallbladder fossa is suspicious for gangrenous cholecystitis.; hyperenhancement of the walls of the common hepatic duct and common bile duct, suspicious for cholangitis.    Presentation consistent with acute cholecystitis with likely concomitant cholangitis  NPO/IVF  CTX/Flagyl  HSQ/SCDs/OOBA/IS  PPI

## 2024-01-25 NOTE — ED PROVIDER NOTE - OBJECTIVE STATEMENT
Pt is a 79yo f, h/o GERD, diverticulosis, with admission last week for choledocholithiasis, thought to have passed a stone, who presents today w/ recurrent r sided abd pain last night, described as pressure and aching, constant, better when lying supine. No n/v, f/c. + soft stools, no blood. + abd distention. No urinary sx's. Denies cp, sob.

## 2024-01-25 NOTE — ED ADULT TRIAGE NOTE - CHIEF COMPLAINT QUOTE
Patient PMH diverticulitis to ED c/o diffuse abdominal pain with n/v/d since 0500 this AM. Recently seen in ED and diagnosed with cholecystitis. Scheduled for lap leon but states symptoms are progressing. Afebrile, ambulatory, AAOX4, NAD.

## 2024-01-25 NOTE — PROGRESS NOTE ADULT - ASSESSMENT
79yo Female pt with PMH GERD and diverticulosis with recent diverticulitis within last year treated with abx (per chart review also PSVT, hepatitis C), PSHx hip surgery (2 years ago s/p mechanical fall) recebtly admitted on 1/18 for concerns of choledocolithiasis with concomittant acute cholecystitis now presents with worsening RUQ pain. Afebrile, nontachycardic, normotensive. On exam, abdomen is soft, nondistended, moderately ttp in RUQ with +Rock. WBC 7.74, T bili 1.3, AST//267, Alk Phos 366. Labs show WBC . MRCP 1/20 demonstrates moderate amount of sludge in distended gallbladder, with gallbladder wall thickening and hyperemia and pericholecystic fluid, consistent with acute cholecystitis; hyperenhancement of the liver parenchyma adjacent to the gallbladder fossa is suspicious for gangrenous cholecystitis.; hyperenhancement of the walls of the common hepatic duct and common bile duct, suspicious for cholangitis. POC WNL    LFD/IVF dec  CTX/Flagyl  HSQ/SCDs/OOBA/IS  PPI  am labs

## 2024-01-26 ENCOUNTER — TRANSCRIPTION ENCOUNTER (OUTPATIENT)
Age: 79
End: 2024-01-26

## 2024-01-26 VITALS
SYSTOLIC BLOOD PRESSURE: 144 MMHG | DIASTOLIC BLOOD PRESSURE: 89 MMHG | HEART RATE: 77 BPM | TEMPERATURE: 98 F | RESPIRATION RATE: 18 BRPM | OXYGEN SATURATION: 97 %

## 2024-01-26 LAB
ALBUMIN SERPL ELPH-MCNC: 3.7 G/DL — SIGNIFICANT CHANGE UP (ref 3.3–5)
ALP SERPL-CCNC: 307 U/L — HIGH (ref 40–120)
ALT FLD-CCNC: 212 U/L — HIGH (ref 10–45)
ANION GAP SERPL CALC-SCNC: 11 MMOL/L — SIGNIFICANT CHANGE UP (ref 5–17)
AST SERPL-CCNC: 162 U/L — HIGH (ref 10–40)
BILIRUB SERPL-MCNC: 0.6 MG/DL — SIGNIFICANT CHANGE UP (ref 0.2–1.2)
BUN SERPL-MCNC: 7 MG/DL — SIGNIFICANT CHANGE UP (ref 7–23)
CALCIUM SERPL-MCNC: 9.3 MG/DL — SIGNIFICANT CHANGE UP (ref 8.4–10.5)
CHLORIDE SERPL-SCNC: 100 MMOL/L — SIGNIFICANT CHANGE UP (ref 96–108)
CO2 SERPL-SCNC: 26 MMOL/L — SIGNIFICANT CHANGE UP (ref 22–31)
CREAT SERPL-MCNC: 0.74 MG/DL — SIGNIFICANT CHANGE UP (ref 0.5–1.3)
EGFR: 84 ML/MIN/1.73M2 — SIGNIFICANT CHANGE UP
GLUCOSE SERPL-MCNC: 105 MG/DL — HIGH (ref 70–99)
HCT VFR BLD CALC: 38.6 % — SIGNIFICANT CHANGE UP (ref 34.5–45)
HGB BLD-MCNC: 13 G/DL — SIGNIFICANT CHANGE UP (ref 11.5–15.5)
MAGNESIUM SERPL-MCNC: 1.9 MG/DL — SIGNIFICANT CHANGE UP (ref 1.6–2.6)
MCHC RBC-ENTMCNC: 30.9 PG — SIGNIFICANT CHANGE UP (ref 27–34)
MCHC RBC-ENTMCNC: 33.7 GM/DL — SIGNIFICANT CHANGE UP (ref 32–36)
MCV RBC AUTO: 91.7 FL — SIGNIFICANT CHANGE UP (ref 80–100)
NRBC # BLD: 0 /100 WBCS — SIGNIFICANT CHANGE UP (ref 0–0)
PHOSPHATE SERPL-MCNC: 4.1 MG/DL — SIGNIFICANT CHANGE UP (ref 2.5–4.5)
PLATELET # BLD AUTO: 292 K/UL — SIGNIFICANT CHANGE UP (ref 150–400)
POTASSIUM SERPL-MCNC: 3.5 MMOL/L — SIGNIFICANT CHANGE UP (ref 3.5–5.3)
POTASSIUM SERPL-SCNC: 3.5 MMOL/L — SIGNIFICANT CHANGE UP (ref 3.5–5.3)
PROT SERPL-MCNC: 6.5 G/DL — SIGNIFICANT CHANGE UP (ref 6–8.3)
RBC # BLD: 4.21 M/UL — SIGNIFICANT CHANGE UP (ref 3.8–5.2)
RBC # FLD: 13.2 % — SIGNIFICANT CHANGE UP (ref 10.3–14.5)
SODIUM SERPL-SCNC: 137 MMOL/L — SIGNIFICANT CHANGE UP (ref 135–145)
WBC # BLD: 13.22 K/UL — HIGH (ref 3.8–10.5)
WBC # FLD AUTO: 13.22 K/UL — HIGH (ref 3.8–10.5)

## 2024-01-26 PROCEDURE — 85730 THROMBOPLASTIN TIME PARTIAL: CPT

## 2024-01-26 PROCEDURE — 86901 BLOOD TYPING SEROLOGIC RH(D): CPT

## 2024-01-26 PROCEDURE — C1889: CPT

## 2024-01-26 PROCEDURE — 88304 TISSUE EXAM BY PATHOLOGIST: CPT

## 2024-01-26 PROCEDURE — 83690 ASSAY OF LIPASE: CPT

## 2024-01-26 PROCEDURE — 81003 URINALYSIS AUTO W/O SCOPE: CPT

## 2024-01-26 PROCEDURE — 96374 THER/PROPH/DIAG INJ IV PUSH: CPT

## 2024-01-26 PROCEDURE — 93005 ELECTROCARDIOGRAM TRACING: CPT

## 2024-01-26 PROCEDURE — 85025 COMPLETE CBC W/AUTO DIFF WBC: CPT

## 2024-01-26 PROCEDURE — 83735 ASSAY OF MAGNESIUM: CPT

## 2024-01-26 PROCEDURE — 84100 ASSAY OF PHOSPHORUS: CPT

## 2024-01-26 PROCEDURE — 36415 COLL VENOUS BLD VENIPUNCTURE: CPT

## 2024-01-26 PROCEDURE — 86850 RBC ANTIBODY SCREEN: CPT

## 2024-01-26 PROCEDURE — C9399: CPT

## 2024-01-26 PROCEDURE — 47562 LAPAROSCOPIC CHOLECYSTECTOMY: CPT

## 2024-01-26 PROCEDURE — 99285 EMERGENCY DEPT VISIT HI MDM: CPT

## 2024-01-26 PROCEDURE — 86900 BLOOD TYPING SEROLOGIC ABO: CPT

## 2024-01-26 PROCEDURE — 85027 COMPLETE CBC AUTOMATED: CPT

## 2024-01-26 PROCEDURE — 85610 PROTHROMBIN TIME: CPT

## 2024-01-26 PROCEDURE — 80053 COMPREHEN METABOLIC PANEL: CPT

## 2024-01-26 RX ORDER — POTASSIUM CHLORIDE 20 MEQ
40 PACKET (EA) ORAL ONCE
Refills: 0 | Status: COMPLETED | OUTPATIENT
Start: 2024-01-26 | End: 2024-01-26

## 2024-01-26 RX ORDER — ACETAMINOPHEN 500 MG
650 TABLET ORAL ONCE
Refills: 0 | Status: COMPLETED | OUTPATIENT
Start: 2024-01-26 | End: 2024-01-26

## 2024-01-26 RX ADMIN — Medication 40 MILLIEQUIVALENT(S): at 09:17

## 2024-01-26 RX ADMIN — OXYCODONE HYDROCHLORIDE 5 MILLIGRAM(S): 5 TABLET ORAL at 03:14

## 2024-01-26 RX ADMIN — OXYCODONE HYDROCHLORIDE 5 MILLIGRAM(S): 5 TABLET ORAL at 04:14

## 2024-01-26 RX ADMIN — HEPARIN SODIUM 5000 UNIT(S): 5000 INJECTION INTRAVENOUS; SUBCUTANEOUS at 13:23

## 2024-01-26 RX ADMIN — BENZOCAINE AND MENTHOL 1 LOZENGE: 5; 1 LIQUID ORAL at 03:16

## 2024-01-26 RX ADMIN — HEPARIN SODIUM 5000 UNIT(S): 5000 INJECTION INTRAVENOUS; SUBCUTANEOUS at 06:11

## 2024-01-26 RX ADMIN — Medication 650 MILLIGRAM(S): at 14:07

## 2024-01-26 RX ADMIN — Medication 650 MILLIGRAM(S): at 15:07

## 2024-01-26 NOTE — PROGRESS NOTE ADULT - SUBJECTIVE AND OBJECTIVE BOX
POST-OP DAY: x1 s/p laparoscopic cholecystectomy      SUBJECTIVE: Patient seen and examined bedside by chief resident. Patient feels well and has no acute complaints. Tolerating CLD, denies any n/v. +BF.     heparin   Injectable 5000 Unit(s) SubCutaneous every 8 hours    MEDICATIONS  (PRN):  benzocaine/menthol Lozenge 1 Lozenge Oral every 3 hours PRN Sore Throat  ondansetron Injectable 4 milliGRAM(s) IV Push every 6 hours PRN Nausea and/or Vomiting  oxyCODONE    IR 2.5 milliGRAM(s) Oral every 6 hours PRN Moderate Pain (4 - 6)  oxyCODONE    IR 5 milliGRAM(s) Oral every 6 hours PRN Severe Pain (7 - 10)      I&O's Detail    25 Jan 2024 07:01  -  26 Jan 2024 07:00  --------------------------------------------------------  IN:    Lactated Ringers: 280 mL  Total IN: 280 mL    OUT:    Voided (mL): 550 mL  Total OUT: 550 mL    Total NET: -270 mL          Vital Signs Last 24 Hrs  T(C): 37.2 (26 Jan 2024 04:28), Max: 37.2 (26 Jan 2024 04:28)  T(F): 99 (26 Jan 2024 04:28), Max: 99 (26 Jan 2024 04:28)  HR: 84 (26 Jan 2024 04:28) (69 - 91)  BP: 119/57 (26 Jan 2024 04:28) (119/57 - 174/81)  BP(mean): 77 (26 Jan 2024 04:28) (77 - 117)  RR: 17 (26 Jan 2024 04:28) (14 - 23)  SpO2: 94% (26 Jan 2024 04:28) (94% - 100%)    Parameters below as of 25 Jan 2024 21:00  Patient On (Oxygen Delivery Method): room air      General: NAD, resting comfortably in bed  Pulm: Nonlabored breathing, no respiratory distress  Abd: soft, NT/ND. incisions c/d/i   Extrem: WWP, no edema, SCDs in place    LABS:                        13.0   13.22 )-----------( 292      ( 26 Jan 2024 07:12 )             38.6     01-26    137  |  100  |  7   ----------------------------<  105<H>  3.5   |  26  |  0.74    Ca    9.3      26 Jan 2024 07:12  Phos  4.1     01-26  Mg     1.9     01-26    TPro  6.5  /  Alb  3.7  /  TBili  0.6  /  DBili  x   /  AST  162<H>  /  ALT  212<H>  /  AlkPhos  307<H>  01-26    PT/INR - ( 25 Jan 2024 11:52 )   PT: 11.7 sec;   INR: 1.03          PTT - ( 25 Jan 2024 11:52 )  PTT:34.3 sec  Urinalysis Basic - ( 26 Jan 2024 07:12 )    Color: x / Appearance: x / SG: x / pH: x  Gluc: 105 mg/dL / Ketone: x  / Bili: x / Urobili: x   Blood: x / Protein: x / Nitrite: x   Leuk Esterase: x / RBC: x / WBC x   Sq Epi: x / Non Sq Epi: x / Bacteria: x        RADIOLOGY & ADDITIONAL STUDIES:

## 2024-01-26 NOTE — DISCHARGE NOTE PROVIDER - HOSPITAL COURSE
79yo Female pt with PMH GERD and diverticulosis with recent diverticulitis within last year treated with abx (per chart review also PSVT, hepatitis C), PSHx hip surgery (2 years ago s/p mechanical fall) recently admitted on 1/18 for concerns of choledocolithiasis with concomittant acute cholecystitis presented to the ED on 1/25 with worsening RUQ pain.   Patient was admitted to General Surgery for further management. Pt was made NPO and received IV antibiotics, IV fluids and adequate pain medication. Remained afebrile and hemodynamically stable. Pt was taken to the OR for a laparoscopic cholecystectomy. The operation was uncomplicated and post-operative course was uneventful. Pt is now tolerating a low fat diet without nausea/vomiting, ambulating without difficulty, and voiding spontaneously with bowel function. Pain is well controlled on oral pain medication. Pt has been deemed ready for discharge and will follow up with the surgeon.      77yo Female pt with PMH GERD and diverticulosis with recent diverticulitis within last year treated with abx (per chart review also PSVT, hepatitis C), PSHx hip surgery (2 years ago s/p mechanical fall) recently admitted on 1/18 for concerns of choledocolithiasis with concomittant acute cholecystitis presented to the ED on 1/25 with worsening RUQ pain.   Patient was admitted to General Surgery for further management. Pt was made NPO and received IV antibiotics, IV fluids and adequate pain medication. Remained afebrile and hemodynamically stable. Pt was taken to the OR for a laparoscopic cholecystectomy. The operation was uncomplicated and post-operative course was uneventful. Pt is now tolerating a low fat diet without nausea/vomiting, ambulating without difficulty, and voiding spontaneously with bowel function. Pain is well controlled on oral pain medication. Pt has been deemed ready for discharge and will follow up with the surgeon.

## 2024-01-26 NOTE — PROGRESS NOTE ADULT - ASSESSMENT
77yo Female pt with PMH GERD and diverticulosis with recent diverticulitis within last year treated with abx (per chart review also PSVT, hepatitis C), PSHx hip surgery (2 years ago s/p mechanical fall) recebtly admitted on 1/18 for concerns of choledocolithiasis with concomittant acute cholecystitis now presents with worsening RUQ pain. Afebrile, nontachycardic, normotensive. On exam, abdomen is soft, nondistended, moderately ttp in RUQ with +Rock. WBC 7.74, T bili 1.3, AST//267, Alk Phos 366. Labs show WBC . MRCP 1/20 demonstrates moderate amount of sludge in distended gallbladder, with gallbladder wall thickening and hyperemia and pericholecystic fluid, consistent with acute cholecystitis; hyperenhancement of the liver parenchyma adjacent to the gallbladder fossa is suspicious for gangrenous cholecystitis.; hyperenhancement of the walls of the common hepatic duct and common bile duct, suspicious for cholangitis.    CLD/IVF  CTX/Flagyl  HSQ/SCDs/OOBA/IS  PPI

## 2024-01-26 NOTE — PATIENT PROFILE ADULT - VISION (WITH CORRECTIVE LENSES IF THE PATIENT USUALLY WEARS THEM):
[FreeTextEntry1] : 72 yo female with hypertension, hyperlipidemia, alcohol abuse, and sinus tachycardia who is currently pending cataract surgery, which was cancelled on 8/2/2 due to HR 130s. No ECG from [facility] could be found from that day.  ECG today demonstrates sinus tachycardia, rate 132 bpm, low voltage QRS.  Suspect that patient's sinus tachycardia may be reflection of possible alcohol withdrawal. She reports that she quit drinking on 8/8/23. Will start metoprolol succinate 50 mg po daily. Patient to return in 1 week for follow-up/ECG. Pending reassessment at that time, will determine if patient will be able to proceed with surgery. Patient was advised to go to ER if she should develop worsening hand tremors and tachycardia.  BP is currently controlled. Will continue lisinopril 10 mg po daily. Will monitor BP following start of metoprolol as above. Normal vision: sees adequately in most situations; can see medication labels, newsprint

## 2024-01-26 NOTE — DISCHARGE NOTE NURSING/CASE MANAGEMENT/SOCIAL WORK - PATIENT PORTAL LINK FT
You can access the FollowMyHealth Patient Portal offered by [facility] by registering at the following website: [email or web address]. By joining [facility]’s FollowMyHealth portal, you will also be able to view your health information using other applications (apps) compatible with our system.

## 2024-01-26 NOTE — DISCHARGE NOTE NURSING/CASE MANAGEMENT/SOCIAL WORK - NSDCFUADDAPPT_GEN_ALL_CORE_FT
Please follow up with Dr. Magallon in one week; you may call the office to make an appointment at your earliest convenience [phone].

## 2024-01-26 NOTE — PATIENT PROFILE ADULT - FALL HARM RISK - HARM RISK INTERVENTIONS

## 2024-01-26 NOTE — DISCHARGE NOTE PROVIDER - CARE PROVIDER_API CALL
Danielito Magallon  Surgical Oncology  [address] [address]  Phone: [phone]  Fax: [phone]  Follow Up Time:

## 2024-01-26 NOTE — DISCHARGE NOTE PROVIDER - NSDCFUADDAPPT_GEN_ALL_CORE_FT
Please follow up with Dr. Magallon in one week; you may call the office to make an appointment at your earliest convenience [phone].

## 2024-01-26 NOTE — DISCHARGE NOTE PROVIDER - NSDCFUADDINST_GEN_ALL_CORE_FT
Please follow up with Dr. Magallon in one week; you may call the office to make an appointment at your earliest convenience [phone].    General Discharge Instructions:  Please resume all regular home medications unless specifically advised not to take a particular medication. Also, please take any new medications as prescribed.  Please get plenty of rest, continue to ambulate several times per day, and drink adequate amounts of fluids. Avoid lifting weights greater than 5-10 lbs until you follow-up with your surgeon, who will instruct you further regarding activity restrictions.  Avoid driving or operating heavy machinery while taking pain medications.  Please follow-up with your surgeon and Primary Care Provider (PCP) as advised.  Incision Care:  *Please call your doctor or nurse practitioner if you have increased pain, swelling, redness, or drainage from the incision site.  *Avoid swimming and baths until your follow-up appointment.  *You may shower, and wash surgical incisions with a mild soap and warm water. Gently pat the area dry.  *If you have staples, they will be removed at your follow-up appointment.  *If you have steri-strips, they will fall off on their own. Please remove any remaining strips 7-10 days after surgery.     Warning Signs:  Please call your doctor or nurse practitioner if you experience the following:  *You experience new chest pain, pressure, squeezing or tightness.  *New or worsening cough, shortness of breath, or wheeze.  *If you are vomiting and cannot keep down fluids or your medications.  *You are getting dehydrated due to continued vomiting, diarrhea, or other reasons. Signs of dehydration include dry mouth, rapid heartbeat, or feeling dizzy or faint when standing.  *You see blood or dark/black material when you vomit or have a bowel movement.  *You experience burning when you urinate, have blood in your urine, or experience a discharge.  *Your pain is not improving within 8-12 hours or is not gone within 24 hours. Call or return immediately if your pain is getting worse, changes location, or moves to your chest or back.  *You have shaking chills, or fever greater than 101.5 degrees Fahrenheit or 38 degrees Celsius.  *Any change in your symptoms, or any new symptoms that concern you.

## 2024-01-30 LAB — SURGICAL PATHOLOGY STUDY: SIGNIFICANT CHANGE UP

## 2024-01-31 ENCOUNTER — INPATIENT (INPATIENT)
Facility: HOSPITAL | Age: 79
LOS: 1 days | Discharge: HOME | DRG: 446 | End: 2024-02-02
Attending: SURGERY | Admitting: SURGERY
Payer: MEDICARE

## 2024-01-31 VITALS
SYSTOLIC BLOOD PRESSURE: 116 MMHG | WEIGHT: 119.05 LBS | OXYGEN SATURATION: 97 % | RESPIRATION RATE: 16 BRPM | TEMPERATURE: 98 F | HEART RATE: 94 BPM | DIASTOLIC BLOOD PRESSURE: 80 MMHG | HEIGHT: 65 IN

## 2024-01-31 LAB
ALBUMIN SERPL ELPH-MCNC: 3.3 G/DL — LOW (ref 3.4–5)
ALP SERPL-CCNC: 527 U/L — HIGH (ref 40–120)
ALT FLD-CCNC: 322 U/L — HIGH (ref 12–42)
ANION GAP SERPL CALC-SCNC: 8 MMOL/L — LOW (ref 9–16)
APPEARANCE UR: ABNORMAL
AST SERPL-CCNC: 321 U/L — HIGH (ref 15–37)
BACTERIA # UR AUTO: ABNORMAL /HPF
BASOPHILS # BLD AUTO: 0.09 K/UL — SIGNIFICANT CHANGE UP (ref 0–0.2)
BASOPHILS NFR BLD AUTO: 0.8 % — SIGNIFICANT CHANGE UP (ref 0–2)
BILIRUB DIRECT SERPL-MCNC: 1.3 MG/DL — HIGH (ref 0–0.3)
BILIRUB SERPL-MCNC: 1.8 MG/DL — HIGH (ref 0.2–1.2)
BILIRUB UR-MCNC: ABNORMAL
BUN SERPL-MCNC: 7 MG/DL — SIGNIFICANT CHANGE UP (ref 7–23)
CALCIUM SERPL-MCNC: 9.4 MG/DL — SIGNIFICANT CHANGE UP (ref 8.5–10.5)
CHLORIDE SERPL-SCNC: 101 MMOL/L — SIGNIFICANT CHANGE UP (ref 96–108)
CO2 SERPL-SCNC: 27 MMOL/L — SIGNIFICANT CHANGE UP (ref 22–31)
COLOR SPEC: SIGNIFICANT CHANGE UP
CREAT SERPL-MCNC: 0.8 MG/DL — SIGNIFICANT CHANGE UP (ref 0.5–1.3)
DIFF PNL FLD: NEGATIVE — SIGNIFICANT CHANGE UP
EGFR: 76 ML/MIN/1.73M2 — SIGNIFICANT CHANGE UP
EOSINOPHIL # BLD AUTO: 0.57 K/UL — HIGH (ref 0–0.5)
EOSINOPHIL NFR BLD AUTO: 5 % — SIGNIFICANT CHANGE UP (ref 0–6)
GLUCOSE SERPL-MCNC: 126 MG/DL — HIGH (ref 70–99)
GLUCOSE UR QL: NEGATIVE MG/DL — SIGNIFICANT CHANGE UP
HCT VFR BLD CALC: 44.4 % — SIGNIFICANT CHANGE UP (ref 34.5–45)
HGB BLD-MCNC: 14.6 G/DL — SIGNIFICANT CHANGE UP (ref 11.5–15.5)
HYALINE CASTS # UR AUTO: PRESENT
IMM GRANULOCYTES NFR BLD AUTO: 0.3 % — SIGNIFICANT CHANGE UP (ref 0–0.9)
KETONES UR-MCNC: NEGATIVE MG/DL — SIGNIFICANT CHANGE UP
LACTATE BLDV-MCNC: 1.6 MMOL/L — SIGNIFICANT CHANGE UP (ref 0.5–2)
LEUKOCYTE ESTERASE UR-ACNC: ABNORMAL
LIDOCAIN IGE QN: 31 U/L — SIGNIFICANT CHANGE UP (ref 16–77)
LYMPHOCYTES # BLD AUTO: 1.01 K/UL — SIGNIFICANT CHANGE UP (ref 1–3.3)
LYMPHOCYTES # BLD AUTO: 8.8 % — LOW (ref 13–44)
MCHC RBC-ENTMCNC: 30.2 PG — SIGNIFICANT CHANGE UP (ref 27–34)
MCHC RBC-ENTMCNC: 32.9 GM/DL — SIGNIFICANT CHANGE UP (ref 32–36)
MCV RBC AUTO: 91.9 FL — SIGNIFICANT CHANGE UP (ref 80–100)
MONOCYTES # BLD AUTO: 0.95 K/UL — HIGH (ref 0–0.9)
MONOCYTES NFR BLD AUTO: 8.3 % — SIGNIFICANT CHANGE UP (ref 2–14)
NEUTROPHILS # BLD AUTO: 8.77 K/UL — HIGH (ref 1.8–7.4)
NEUTROPHILS NFR BLD AUTO: 76.8 % — SIGNIFICANT CHANGE UP (ref 43–77)
NITRITE UR-MCNC: NEGATIVE — SIGNIFICANT CHANGE UP
NRBC # BLD: 0 /100 WBCS — SIGNIFICANT CHANGE UP (ref 0–0)
PH UR: 8.5 (ref 5–8)
PLATELET # BLD AUTO: 374 K/UL — SIGNIFICANT CHANGE UP (ref 150–400)
POTASSIUM SERPL-MCNC: 4.2 MMOL/L — SIGNIFICANT CHANGE UP (ref 3.5–5.3)
POTASSIUM SERPL-SCNC: 4.2 MMOL/L — SIGNIFICANT CHANGE UP (ref 3.5–5.3)
PROT SERPL-MCNC: 7.8 G/DL — SIGNIFICANT CHANGE UP (ref 6.4–8.2)
PROT UR-MCNC: ABNORMAL MG/DL
RBC # BLD: 4.83 M/UL — SIGNIFICANT CHANGE UP (ref 3.8–5.2)
RBC # FLD: 13.1 % — SIGNIFICANT CHANGE UP (ref 10.3–14.5)
RBC CASTS # UR COMP ASSIST: 2 /HPF — SIGNIFICANT CHANGE UP (ref 0–4)
SODIUM SERPL-SCNC: 136 MMOL/L — SIGNIFICANT CHANGE UP (ref 132–145)
SP GR SPEC: 1.02 — SIGNIFICANT CHANGE UP (ref 1–1.03)
UROBILINOGEN FLD QL: 1 MG/DL — SIGNIFICANT CHANGE UP (ref 0.2–1)
WBC # BLD: 11.42 K/UL — HIGH (ref 3.8–10.5)
WBC # FLD AUTO: 11.42 K/UL — HIGH (ref 3.8–10.5)
WBC UR QL: 0 /HPF — SIGNIFICANT CHANGE UP (ref 0–5)

## 2024-01-31 PROCEDURE — 99285 EMERGENCY DEPT VISIT HI MDM: CPT

## 2024-01-31 PROCEDURE — 71045 X-RAY EXAM CHEST 1 VIEW: CPT | Mod: 26

## 2024-01-31 PROCEDURE — 74177 CT ABD & PELVIS W/CONTRAST: CPT | Mod: 26,MH

## 2024-01-31 RX ORDER — ONDANSETRON 8 MG/1
4 TABLET, FILM COATED ORAL EVERY 6 HOURS
Refills: 0 | Status: DISCONTINUED | OUTPATIENT
Start: 2024-01-31 | End: 2024-02-02

## 2024-01-31 RX ORDER — PANTOPRAZOLE SODIUM 20 MG/1
40 TABLET, DELAYED RELEASE ORAL DAILY
Refills: 0 | Status: DISCONTINUED | OUTPATIENT
Start: 2024-01-31 | End: 2024-02-02

## 2024-01-31 RX ORDER — HEPARIN SODIUM 5000 [USP'U]/ML
5000 INJECTION INTRAVENOUS; SUBCUTANEOUS EVERY 8 HOURS
Refills: 0 | Status: DISCONTINUED | OUTPATIENT
Start: 2024-01-31 | End: 2024-02-02

## 2024-01-31 RX ORDER — SODIUM CHLORIDE 9 MG/ML
1000 INJECTION, SOLUTION INTRAVENOUS
Refills: 0 | Status: DISCONTINUED | OUTPATIENT
Start: 2024-01-31 | End: 2024-02-01

## 2024-01-31 RX ORDER — SODIUM CHLORIDE 9 MG/ML
1000 INJECTION INTRAMUSCULAR; INTRAVENOUS; SUBCUTANEOUS ONCE
Refills: 0 | Status: COMPLETED | OUTPATIENT
Start: 2024-01-31 | End: 2024-01-31

## 2024-01-31 RX ORDER — MORPHINE SULFATE 50 MG/1
6 CAPSULE, EXTENDED RELEASE ORAL ONCE
Refills: 0 | Status: DISCONTINUED | OUTPATIENT
Start: 2024-01-31 | End: 2024-01-31

## 2024-01-31 RX ORDER — IOHEXOL 300 MG/ML
30 INJECTION, SOLUTION INTRAVENOUS ONCE
Refills: 0 | Status: COMPLETED | OUTPATIENT
Start: 2024-01-31 | End: 2024-01-31

## 2024-01-31 RX ORDER — KETOROLAC TROMETHAMINE 30 MG/ML
15 SYRINGE (ML) INJECTION ONCE
Refills: 0 | Status: DISCONTINUED | OUTPATIENT
Start: 2024-01-31 | End: 2024-01-31

## 2024-01-31 RX ORDER — MORPHINE SULFATE 50 MG/1
4 CAPSULE, EXTENDED RELEASE ORAL ONCE
Refills: 0 | Status: DISCONTINUED | OUTPATIENT
Start: 2024-01-31 | End: 2024-01-31

## 2024-01-31 RX ORDER — VANCOMYCIN HCL 1 G
1000 VIAL (EA) INTRAVENOUS ONCE
Refills: 0 | Status: COMPLETED | OUTPATIENT
Start: 2024-01-31 | End: 2024-01-31

## 2024-01-31 RX ORDER — PIPERACILLIN AND TAZOBACTAM 4; .5 G/20ML; G/20ML
3.38 INJECTION, POWDER, LYOPHILIZED, FOR SOLUTION INTRAVENOUS EVERY 8 HOURS
Refills: 0 | Status: DISCONTINUED | OUTPATIENT
Start: 2024-01-31 | End: 2024-02-02

## 2024-01-31 RX ORDER — PIPERACILLIN AND TAZOBACTAM 4; .5 G/20ML; G/20ML
3.38 INJECTION, POWDER, LYOPHILIZED, FOR SOLUTION INTRAVENOUS ONCE
Refills: 0 | Status: COMPLETED | OUTPATIENT
Start: 2024-01-31 | End: 2024-01-31

## 2024-01-31 RX ADMIN — HEPARIN SODIUM 5000 UNIT(S): 5000 INJECTION INTRAVENOUS; SUBCUTANEOUS at 22:38

## 2024-01-31 RX ADMIN — IOHEXOL 30 MILLILITER(S): 300 INJECTION, SOLUTION INTRAVENOUS at 07:18

## 2024-01-31 RX ADMIN — SODIUM CHLORIDE 1000 MILLILITER(S): 9 INJECTION INTRAMUSCULAR; INTRAVENOUS; SUBCUTANEOUS at 07:23

## 2024-01-31 RX ADMIN — SODIUM CHLORIDE 1000 MILLILITER(S): 9 INJECTION INTRAMUSCULAR; INTRAVENOUS; SUBCUTANEOUS at 07:22

## 2024-01-31 RX ADMIN — PIPERACILLIN AND TAZOBACTAM 25 GRAM(S): 4; .5 INJECTION, POWDER, LYOPHILIZED, FOR SOLUTION INTRAVENOUS at 22:38

## 2024-01-31 RX ADMIN — PIPERACILLIN AND TAZOBACTAM 200 GRAM(S): 4; .5 INJECTION, POWDER, LYOPHILIZED, FOR SOLUTION INTRAVENOUS at 11:45

## 2024-01-31 RX ADMIN — MORPHINE SULFATE 6 MILLIGRAM(S): 50 CAPSULE, EXTENDED RELEASE ORAL at 07:20

## 2024-01-31 RX ADMIN — Medication 15 MILLIGRAM(S): at 08:33

## 2024-01-31 RX ADMIN — Medication 250 MILLIGRAM(S): at 12:34

## 2024-01-31 NOTE — ED ADULT NURSE NOTE - CHIEF COMPLAINT QUOTE
Pt BIBA c/o abdominal pain. +tenderness on palpation. Recent cholecystectomy at [facility]. States pain worsening today, +mild nausea without vomiting.

## 2024-01-31 NOTE — PATIENT PROFILE ADULT - FALL HARM RISK - HARM RISK INTERVENTIONS

## 2024-01-31 NOTE — ED ADULT NURSE REASSESSMENT NOTE - NS ED NURSE REASSESS COMMENT FT1
care assumed, pt w/o s/s of distress, awaiting imaging. Will continue to monitor for change in assessment

## 2024-01-31 NOTE — ED PROVIDER NOTE - CLINICAL SUMMARY MEDICAL DECISION MAKING FREE TEXT BOX
well appearing pt here with worsening lower abd pain s/p cholecystectomy POD 6 here with lower abd pain worse with palpation on exam, no guarding or rebound, no vomiting, given recent h/o sgx and worsening abd pain will obtain advanced imaging to r/o perf, abscess, bleeding, plan: cbc, cmp, lipase, ctap

## 2024-01-31 NOTE — ED ADULT TRIAGE NOTE - CHIEF COMPLAINT QUOTE
Pt BIBA c/o abdominal pain. +tenderness on palpation. Recent cholecystectomy at [facility]. States pain worsening today, +mild nausea without vomiting.

## 2024-01-31 NOTE — ED PROVIDER NOTE - OBJECTIVE STATEMENT
77yo Female pt with PMH GERD and diverticulosis with recent diverticulitis within last year treated with abx (per chart review also PSVT, hepatitis C), PSHx hip surgery (2 years ago s/p mechanical fall) recently admitted on 1/18 for concerns of choledocholithiasis with concomitant acute cholecystitis POD#6 pw worsening lower abd pain over the course of the last 6 d passing gas and stools, reports that pain is increasing in severity with no provoking or modifying factors. No n/v. No fevers or chills. Denies dysuria, urgency, frequency.    I have reviewed available current nursing and previous documentation of past medical, surgical, family, and/or social history.

## 2024-01-31 NOTE — ED ADULT NURSE REASSESSMENT NOTE - NS ED NURSE REASSESS COMMENT FT1
Break coverage for rn geoffrey, introduced self to patient, report given to floor, info given to patient to give her loved ones, sitting up in bed, gcs 15 nil c/o pain awaiting transport , pt aware

## 2024-01-31 NOTE — ED PROVIDER NOTE - PHYSICAL EXAMINATION
Physical Exam    Vital Signs: I have reviewed the initial vital signs.  Constitutional: well-appearing, appears stated age  Eyes: PERRLA, EOM intact, RAPD absent,  and symmetrical lids.  ENT: Neck supple with no adenopathy, moist MM.  Cardiovascular: regular rate, regular rhythm, well-perfused extremities  Respiratory: unlabored respiratory effort, clear to auscultation bilaterally  Gastrointestinal: soft, +lower abd ttp L>R no guarding or rebound, no cvat b/l, non distended   Musculoskeletal: supple neck, no lower extremity edema  Integumentary: warm, dry, no rash  Neurologic: extremities’ motor and sensory functions grossly intact  Psychiatric: A&Ox3, appropriate mood, appropriate affect

## 2024-02-01 LAB
-  STREPTOCOCCUS SP. (NOT GRP A, B OR S PNEUMONIAE): SIGNIFICANT CHANGE UP
ALBUMIN SERPL ELPH-MCNC: 3.3 G/DL — SIGNIFICANT CHANGE UP (ref 3.3–5)
ALP SERPL-CCNC: 415 U/L — HIGH (ref 40–120)
ALT FLD-CCNC: 190 U/L — HIGH (ref 10–45)
ANION GAP SERPL CALC-SCNC: 10 MMOL/L — SIGNIFICANT CHANGE UP (ref 5–17)
AST SERPL-CCNC: 124 U/L — HIGH (ref 10–40)
BILIRUB DIRECT SERPL-MCNC: 0.4 MG/DL — HIGH (ref 0–0.3)
BILIRUB INDIRECT FLD-MCNC: 0.6 MG/DL — SIGNIFICANT CHANGE UP (ref 0.2–1)
BILIRUB SERPL-MCNC: 1 MG/DL — SIGNIFICANT CHANGE UP (ref 0.2–1.2)
BUN SERPL-MCNC: 5 MG/DL — LOW (ref 7–23)
CALCIUM SERPL-MCNC: 8.9 MG/DL — SIGNIFICANT CHANGE UP (ref 8.4–10.5)
CHLORIDE SERPL-SCNC: 101 MMOL/L — SIGNIFICANT CHANGE UP (ref 96–108)
CO2 SERPL-SCNC: 27 MMOL/L — SIGNIFICANT CHANGE UP (ref 22–31)
CREAT SERPL-MCNC: 0.78 MG/DL — SIGNIFICANT CHANGE UP (ref 0.5–1.3)
CULTURE RESULTS: SIGNIFICANT CHANGE UP
EGFR: 79 ML/MIN/1.73M2 — SIGNIFICANT CHANGE UP
GLUCOSE SERPL-MCNC: 102 MG/DL — HIGH (ref 70–99)
GRAM STN FLD: ABNORMAL
HCT VFR BLD CALC: 40.2 % — SIGNIFICANT CHANGE UP (ref 34.5–45)
HGB BLD-MCNC: 13.2 G/DL — SIGNIFICANT CHANGE UP (ref 11.5–15.5)
MAGNESIUM SERPL-MCNC: 2 MG/DL — SIGNIFICANT CHANGE UP (ref 1.6–2.6)
MCHC RBC-ENTMCNC: 30.6 PG — SIGNIFICANT CHANGE UP (ref 27–34)
MCHC RBC-ENTMCNC: 32.8 GM/DL — SIGNIFICANT CHANGE UP (ref 32–36)
MCV RBC AUTO: 93.1 FL — SIGNIFICANT CHANGE UP (ref 80–100)
METHOD TYPE: SIGNIFICANT CHANGE UP
NRBC # BLD: 0 /100 WBCS — SIGNIFICANT CHANGE UP (ref 0–0)
PHOSPHATE SERPL-MCNC: 4 MG/DL — SIGNIFICANT CHANGE UP (ref 2.5–4.5)
PLATELET # BLD AUTO: 338 K/UL — SIGNIFICANT CHANGE UP (ref 150–400)
POTASSIUM SERPL-MCNC: 3.9 MMOL/L — SIGNIFICANT CHANGE UP (ref 3.5–5.3)
POTASSIUM SERPL-SCNC: 3.9 MMOL/L — SIGNIFICANT CHANGE UP (ref 3.5–5.3)
PROT SERPL-MCNC: 6.4 G/DL — SIGNIFICANT CHANGE UP (ref 6–8.3)
RBC # BLD: 4.32 M/UL — SIGNIFICANT CHANGE UP (ref 3.8–5.2)
RBC # FLD: 13.4 % — SIGNIFICANT CHANGE UP (ref 10.3–14.5)
SODIUM SERPL-SCNC: 138 MMOL/L — SIGNIFICANT CHANGE UP (ref 135–145)
SPECIMEN SOURCE: SIGNIFICANT CHANGE UP
WBC # BLD: 10.01 K/UL — SIGNIFICANT CHANGE UP (ref 3.8–10.5)
WBC # FLD AUTO: 10.01 K/UL — SIGNIFICANT CHANGE UP (ref 3.8–10.5)

## 2024-02-01 PROCEDURE — 74183 MRI ABD W/O CNTR FLWD CNTR: CPT | Mod: 26

## 2024-02-01 RX ORDER — SODIUM CHLORIDE 9 MG/ML
1000 INJECTION INTRAMUSCULAR; INTRAVENOUS; SUBCUTANEOUS
Refills: 0 | Status: DISCONTINUED | OUTPATIENT
Start: 2024-02-01 | End: 2024-02-02

## 2024-02-01 RX ADMIN — PIPERACILLIN AND TAZOBACTAM 25 GRAM(S): 4; .5 INJECTION, POWDER, LYOPHILIZED, FOR SOLUTION INTRAVENOUS at 06:12

## 2024-02-01 RX ADMIN — HEPARIN SODIUM 5000 UNIT(S): 5000 INJECTION INTRAVENOUS; SUBCUTANEOUS at 22:15

## 2024-02-01 RX ADMIN — PANTOPRAZOLE SODIUM 40 MILLIGRAM(S): 20 TABLET, DELAYED RELEASE ORAL at 13:19

## 2024-02-01 RX ADMIN — HEPARIN SODIUM 5000 UNIT(S): 5000 INJECTION INTRAVENOUS; SUBCUTANEOUS at 06:12

## 2024-02-01 RX ADMIN — PIPERACILLIN AND TAZOBACTAM 25 GRAM(S): 4; .5 INJECTION, POWDER, LYOPHILIZED, FOR SOLUTION INTRAVENOUS at 22:15

## 2024-02-01 RX ADMIN — SODIUM CHLORIDE 40 MILLILITER(S): 9 INJECTION INTRAMUSCULAR; INTRAVENOUS; SUBCUTANEOUS at 02:19

## 2024-02-01 RX ADMIN — PIPERACILLIN AND TAZOBACTAM 25 GRAM(S): 4; .5 INJECTION, POWDER, LYOPHILIZED, FOR SOLUTION INTRAVENOUS at 14:21

## 2024-02-01 RX ADMIN — HEPARIN SODIUM 5000 UNIT(S): 5000 INJECTION INTRAVENOUS; SUBCUTANEOUS at 14:29

## 2024-02-01 NOTE — H&P ADULT - HISTORY OF PRESENT ILLNESS
77yo Female pt with PMH GERD and diverticulosis with recent diverticulitis within last year treated with abx, PSHx hip surgery, and most recently s/p cholecystectomy (1/25). Presented to [facility] for complaint of worsening lower abd pain over the course of the last 6 d passing gas and stools, reports that pain is increasing in severity with no provoking or modifying factors.   Denies sob/dizziness/vomiting/cp/hematuria/

## 2024-02-01 NOTE — H&P ADULT - NSHPPHYSICALEXAM_GEN_ALL_CORE
Constitutional: well-appearing, appears stated age  Eyes: PERRLA  Cardiovascular: regular rate, regular rhythm, well-perfused extremities  Respiratory: unlabored respiratory effort,   Gastrointestinal: soft, ND/NT, no guarding or rebound,   Incisions; c/d/i  Musculoskeletal: no lower extremity edema

## 2024-02-01 NOTE — H&P ADULT - NSHPLABSRESULTS_GEN_ALL_CORE
< from: CT Abdomen and Pelvis w/ Oral Cont and w/ IV Cont (01.31.24 @ 10:10) >    PROCEDURE DATE:  01/31/2024          INTERPRETATION:  CLINICAL INFORMATION: 78-year-old female. Lower   abdominal pain.    COMPARISON: CT abdomen pelvis 1/18/2024.    CONTRAST/COMPLICATIONS:  IV Contrast: Omnipaque 350  96 cc administered   4 cc discarded  Oral Contrast: Administered  Complications: None    PROCEDURE:  CT of the Abdomen and Pelvis was performed.  Sagittal and coronal reformats were performed.    FINDINGS:  LOWER CHEST: There is suggestion of a tiny hiatal hernia..    LIVER: Within normal limits.  BILE DUCTS: Minimal intrahepatic biliary dilatation. The common bile duct   is dilated as before measuring up to about 1.4 cm common bile duct wall   thickening-as before.  GALLBLADDER: Status post interval cholecystectomy. No fluid collection in   the gallbladder fossa.  SPLEEN: Within normal limits.  PANCREAS: Within normal limits.  ADRENALS: Within normal limits.  KIDNEYS/URETERS: Within normal limits.    BLADDER: Within normal limits.  REPRODUCTIVE ORGANS: Lobulated calcified multi fibroid uterus measuring   8.4 x 9.8 x 6.5 cm. Ovaries normal in size. Possible 1.3 cm right ovarian   cyst. Possible 1.0 cm left ovarian cyst.    BOWEL: Theingested oral contrast material has reached the upper   ascending colon. No bowel obstruction. Colon loaded with fecal material.   No evidence of colitis or colonic diverticulitis. Scattered colonic   diverticula-particularly sigmoid. Normal appendix.  PERITONEUM: No ascites.  VESSELS: Within normal limits.  RETROPERITONEUM/LYMPH NODES: No lymphadenopathy.  ABDOMINAL WALL: Postop changes at the umbilicus.  BONES: Left proximal femoral Jin pins. Lumbar levoscoliosis.   Degenerative disc disease L2-L3 and L5-S1.    IMPRESSION:  Status post interval cholecystectomy as compared to CT 1/18/2024.  Unchanged dilated common bile duct with wall thickening which could   represent cholangitis.  No evidence of colitis or colonic diverticulitis.    < end of copied text >    Bilirubin Direct (01.31.24 @ 12:00)   Bilirubin Direct: 1.3 mg/dLBlood Gas Venous - Lactate: 1.6 mmol/L (01.31.24 @ 07:13)Lipase (01.31.24 @ 07:04)   Lipase: 31: New Lipase reference range as of 08/24/2023. Complete Blood Count + Automated Diff (01.31.24 @ 07:04)   WBC Count: 11.42 K/uL  RBC Count: 4.83 M/uL  Hemoglobin: 14.6 g/dL  Hematocrit: 44.4 %Comprehensive Metabolic Panel (01.31.24 @ 07:04)   Sodium: 136 mmoL/L  Potassium: 4.2 mmol/L  Chloride: 101 mmol/L  Carbon Dioxide: 27 mmol/L  Anion Gap: 8 mmoL/L  Blood Urea Nitrogen: 7 mg/dL  Creatinine: 0.80 mg/dL  Glucose: 126 mg/dL  Calcium: 9.4 mg/dL  Protein Total: 7.8 g/dL  Albumin: 3.3 g/dL  Bilirubin Total: 1.8: Use of this assay is not recommended for patients undergoing treatment   with eltrombopag due to the potential for falsely elevated results mg/dL  Alkaline Phosphatase: 527 U/L  Aspartate Aminotransferase (AST/SGOT): 321 U/L  Alanine Aminotransferase (ALT/SGPT): 322 U/L

## 2024-02-01 NOTE — H&P ADULT - ASSESSMENT
77yo Female pt with PMH GERD and diverticulosis with recent diverticulitis within last year treated with abx (per chart review also PSVT, hepatitis C), PSHx hip surgery (2 years ago s/p mechanical fall) s/p cholecystectomy POD#6. Transfer from [facility] for  pw worsening lower abd pain. r/o cholangitis.    CLD/IVF  Zosyn  MRCP ordered  home meds  SQH/IS/OOBA  pain/ nausea control

## 2024-02-01 NOTE — H&P ADULT - NSVTERISKREFERASSESS_GEN_ALL_CORE
Called left message for patient to return call to schedule appointments from todays virtual visit   Refer to the Assessment tab to view/cancel completed assessment.

## 2024-02-01 NOTE — PROGRESS NOTE ADULT - SUBJECTIVE AND OBJECTIVE BOX
SUBJECTIVE: Patient seen and examined bedside by chief resident. Patient notes good improvement of pain after starting abx. No fevers, nausea or vomiting. No acute complaints.     heparin   Injectable 5000 Unit(s) SubCutaneous every 8 hours  piperacillin/tazobactam IVPB.. 3.375 Gram(s) IV Intermittent every 8 hours    MEDICATIONS  (PRN):  ondansetron Injectable 4 milliGRAM(s) IV Push every 6 hours PRN Nausea and/or Vomiting      I&O's Detail      Vital Signs Last 24 Hrs  T(C): 36.6 (01 Feb 2024 05:40), Max: 37.5 (31 Jan 2024 20:04)  T(F): 97.8 (01 Feb 2024 05:40), Max: 99.5 (31 Jan 2024 20:04)  HR: 84 (01 Feb 2024 05:40) (81 - 111)  BP: 113/64 (01 Feb 2024 05:40) (113/64 - 147/79)  BP(mean): --  RR: 16 (01 Feb 2024 05:40) (16 - 18)  SpO2: 97% (01 Feb 2024 05:40) (93% - 97%)    Parameters below as of 01 Feb 2024 05:40  Patient On (Oxygen Delivery Method): room air        General: NAD, resting comfortably in bed  Pulm: Nonlabored breathing, no respiratory distress  Abd: soft, NT/ND. previous incisions c/d/i   Extrem: WWP, no edema, SCDs in place    LABS:                        13.2   10.01 )-----------( 338      ( 01 Feb 2024 05:30 )             40.2     02-01    138  |  101  |  5<L>  ----------------------------<  102<H>  3.9   |  27  |  0.78    Ca    8.9      01 Feb 2024 05:30  Phos  4.0     02-01  Mg     2.0     02-01    TPro  6.4  /  Alb  3.3  /  TBili  1.0  /  DBili  0.4<H>  /  AST  124<H>  /  ALT  190<H>  /  AlkPhos  415<H>  02-01      Urinalysis Basic - ( 01 Feb 2024 05:30 )    Color: x / Appearance: x / SG: x / pH: x  Gluc: 102 mg/dL / Ketone: x  / Bili: x / Urobili: x   Blood: x / Protein: x / Nitrite: x   Leuk Esterase: x / RBC: x / WBC x   Sq Epi: x / Non Sq Epi: x / Bacteria: x        RADIOLOGY & ADDITIONAL STUDIES:  CT Abdomen and Pelvis w/ Oral Cont and w/ IV Cont:   ACC: [id number] EXAM:  CT ABDOMEN AND PELVIS OC IC   ORDERED BY: DUNG LIN     PROCEDURE DATE:  01/31/2024          INTERPRETATION:  CLINICAL INFORMATION: 78-year-old female. Lower   abdominal pain.    COMPARISON: CT abdomen pelvis 1/18/2024.    CONTRAST/COMPLICATIONS:  IV Contrast: Omnipaque 350  96 cc administered   4 cc discarded  Oral Contrast: Administered  Complications: None    PROCEDURE:  CT of the Abdomen and Pelvis was performed.  Sagittal and coronal reformats were performed.    FINDINGS:  LOWER CHEST: There is suggestion of a tiny hiatal hernia..    LIVER: Within normal limits.  BILE DUCTS: Minimal intrahepatic biliary dilatation. The common bile duct   is dilated as before measuring up to about 1.4 cm common bile duct wall   thickening-as before.  GALLBLADDER: Status post interval cholecystectomy. No fluid collection in   the gallbladder fossa.  SPLEEN: Within normal limits.  PANCREAS: Within normal limits.  ADRENALS: Within normal limits.  KIDNEYS/URETERS: Within normal limits.    BLADDER: Within normal limits.  REPRODUCTIVE ORGANS: Lobulated calcified multi fibroid uterus measuring   8.4 x 9.8 x 6.5 cm. Ovaries normal in size. Possible 1.3 cm right ovarian   cyst. Possible 1.0 cm left ovarian cyst.    BOWEL: Theingested oral contrast material has reached the upper   ascending colon. No bowel obstruction. Colon loaded with fecal material.   No evidence of colitis or colonic diverticulitis. Scattered colonic   diverticula-particularly sigmoid. Normal appendix.  PERITONEUM: No ascites.  VESSELS: Within normal limits.  RETROPERITONEUM/LYMPH NODES: No lymphadenopathy.  ABDOMINAL WALL: Postop changes at the umbilicus.  BONES: Left proximal femoral Jin pins. Lumbar levoscoliosis.   Degenerative disc disease L2-L3 and L5-S1.    IMPRESSION:  Status post interval cholecystectomy as compared to CT 1/18/2024.  Unchanged dilated common bile duct with wall thickening which could   represent cholangitis.  No evidence of colitis or colonic diverticulitis.        --- End of Report ---            AVIVA MELGAR MD; Attending Radiologist  This document has been electronically signed. Jan 31 2024 10:27AM (01-31-24 @ 10:10)

## 2024-02-02 ENCOUNTER — TRANSCRIPTION ENCOUNTER (OUTPATIENT)
Age: 79
End: 2024-02-02

## 2024-02-02 VITALS
RESPIRATION RATE: 18 BRPM | TEMPERATURE: 98 F | DIASTOLIC BLOOD PRESSURE: 81 MMHG | OXYGEN SATURATION: 97 % | HEART RATE: 86 BPM | SYSTOLIC BLOOD PRESSURE: 126 MMHG

## 2024-02-02 DIAGNOSIS — K21.9 GASTRO-ESOPHAGEAL REFLUX DISEASE WITHOUT ESOPHAGITIS: ICD-10-CM

## 2024-02-02 DIAGNOSIS — K44.9 DIAPHRAGMATIC HERNIA WITHOUT OBSTRUCTION OR GANGRENE: ICD-10-CM

## 2024-02-02 DIAGNOSIS — K80.50 CALCULUS OF BILE DUCT WITHOUT CHOLANGITIS OR CHOLECYSTITIS WITHOUT OBSTRUCTION: ICD-10-CM

## 2024-02-02 LAB
ALBUMIN SERPL ELPH-MCNC: 3.2 G/DL — LOW (ref 3.3–5)
ALP SERPL-CCNC: 344 U/L — HIGH (ref 40–120)
ALT FLD-CCNC: 125 U/L — HIGH (ref 10–45)
ANION GAP SERPL CALC-SCNC: 6 MMOL/L — SIGNIFICANT CHANGE UP (ref 5–17)
AST SERPL-CCNC: 53 U/L — HIGH (ref 10–40)
BILIRUB DIRECT SERPL-MCNC: 0.3 MG/DL — SIGNIFICANT CHANGE UP (ref 0–0.3)
BILIRUB INDIRECT FLD-MCNC: 0.3 MG/DL — SIGNIFICANT CHANGE UP (ref 0.2–1)
BILIRUB SERPL-MCNC: 0.6 MG/DL — SIGNIFICANT CHANGE UP (ref 0.2–1.2)
BUN SERPL-MCNC: 7 MG/DL — SIGNIFICANT CHANGE UP (ref 7–23)
CALCIUM SERPL-MCNC: 8.9 MG/DL — SIGNIFICANT CHANGE UP (ref 8.4–10.5)
CHLORIDE SERPL-SCNC: 104 MMOL/L — SIGNIFICANT CHANGE UP (ref 96–108)
CO2 SERPL-SCNC: 29 MMOL/L — SIGNIFICANT CHANGE UP (ref 22–31)
CREAT SERPL-MCNC: 0.84 MG/DL — SIGNIFICANT CHANGE UP (ref 0.5–1.3)
EGFR: 72 ML/MIN/1.73M2 — SIGNIFICANT CHANGE UP
GLUCOSE SERPL-MCNC: 102 MG/DL — HIGH (ref 70–99)
HCT VFR BLD CALC: 39.9 % — SIGNIFICANT CHANGE UP (ref 34.5–45)
HGB BLD-MCNC: 13 G/DL — SIGNIFICANT CHANGE UP (ref 11.5–15.5)
MAGNESIUM SERPL-MCNC: 2.1 MG/DL — SIGNIFICANT CHANGE UP (ref 1.6–2.6)
MCHC RBC-ENTMCNC: 30.6 PG — SIGNIFICANT CHANGE UP (ref 27–34)
MCHC RBC-ENTMCNC: 32.6 GM/DL — SIGNIFICANT CHANGE UP (ref 32–36)
MCV RBC AUTO: 93.9 FL — SIGNIFICANT CHANGE UP (ref 80–100)
NRBC # BLD: 0 /100 WBCS — SIGNIFICANT CHANGE UP (ref 0–0)
PHOSPHATE SERPL-MCNC: 3.6 MG/DL — SIGNIFICANT CHANGE UP (ref 2.5–4.5)
PLATELET # BLD AUTO: 343 K/UL — SIGNIFICANT CHANGE UP (ref 150–400)
POTASSIUM SERPL-MCNC: 4.2 MMOL/L — SIGNIFICANT CHANGE UP (ref 3.5–5.3)
POTASSIUM SERPL-SCNC: 4.2 MMOL/L — SIGNIFICANT CHANGE UP (ref 3.5–5.3)
PROT SERPL-MCNC: 6.2 G/DL — SIGNIFICANT CHANGE UP (ref 6–8.3)
RBC # BLD: 4.25 M/UL — SIGNIFICANT CHANGE UP (ref 3.8–5.2)
RBC # FLD: 13.4 % — SIGNIFICANT CHANGE UP (ref 10.3–14.5)
SODIUM SERPL-SCNC: 139 MMOL/L — SIGNIFICANT CHANGE UP (ref 135–145)
WBC # BLD: 9.29 K/UL — SIGNIFICANT CHANGE UP (ref 3.8–10.5)
WBC # FLD AUTO: 9.29 K/UL — SIGNIFICANT CHANGE UP (ref 3.8–10.5)

## 2024-02-02 PROCEDURE — 74183 MRI ABD W/O CNTR FLWD CNTR: CPT

## 2024-02-02 PROCEDURE — 85027 COMPLETE CBC AUTOMATED: CPT

## 2024-02-02 PROCEDURE — 71045 X-RAY EXAM CHEST 1 VIEW: CPT

## 2024-02-02 PROCEDURE — 85025 COMPLETE CBC W/AUTO DIFF WBC: CPT

## 2024-02-02 PROCEDURE — 82248 BILIRUBIN DIRECT: CPT

## 2024-02-02 PROCEDURE — 87077 CULTURE AEROBIC IDENTIFY: CPT

## 2024-02-02 PROCEDURE — 80076 HEPATIC FUNCTION PANEL: CPT

## 2024-02-02 PROCEDURE — 36415 COLL VENOUS BLD VENIPUNCTURE: CPT

## 2024-02-02 PROCEDURE — A9585: CPT

## 2024-02-02 PROCEDURE — 81001 URINALYSIS AUTO W/SCOPE: CPT

## 2024-02-02 PROCEDURE — 87186 SC STD MICRODIL/AGAR DIL: CPT

## 2024-02-02 PROCEDURE — 83735 ASSAY OF MAGNESIUM: CPT

## 2024-02-02 PROCEDURE — 87150 DNA/RNA AMPLIFIED PROBE: CPT

## 2024-02-02 PROCEDURE — 80048 BASIC METABOLIC PNL TOTAL CA: CPT

## 2024-02-02 PROCEDURE — 83605 ASSAY OF LACTIC ACID: CPT

## 2024-02-02 PROCEDURE — 87040 BLOOD CULTURE FOR BACTERIA: CPT

## 2024-02-02 PROCEDURE — 83690 ASSAY OF LIPASE: CPT

## 2024-02-02 PROCEDURE — 84100 ASSAY OF PHOSPHORUS: CPT

## 2024-02-02 PROCEDURE — 87086 URINE CULTURE/COLONY COUNT: CPT

## 2024-02-02 PROCEDURE — 74177 CT ABD & PELVIS W/CONTRAST: CPT

## 2024-02-02 PROCEDURE — 99285 EMERGENCY DEPT VISIT HI MDM: CPT

## 2024-02-02 PROCEDURE — 80053 COMPREHEN METABOLIC PANEL: CPT

## 2024-02-02 RX ADMIN — PIPERACILLIN AND TAZOBACTAM 25 GRAM(S): 4; .5 INJECTION, POWDER, LYOPHILIZED, FOR SOLUTION INTRAVENOUS at 06:24

## 2024-02-02 RX ADMIN — PIPERACILLIN AND TAZOBACTAM 25 GRAM(S): 4; .5 INJECTION, POWDER, LYOPHILIZED, FOR SOLUTION INTRAVENOUS at 14:51

## 2024-02-02 RX ADMIN — HEPARIN SODIUM 5000 UNIT(S): 5000 INJECTION INTRAVENOUS; SUBCUTANEOUS at 14:52

## 2024-02-02 RX ADMIN — SODIUM CHLORIDE 90 MILLILITER(S): 9 INJECTION INTRAMUSCULAR; INTRAVENOUS; SUBCUTANEOUS at 12:53

## 2024-02-02 RX ADMIN — SODIUM CHLORIDE 90 MILLILITER(S): 9 INJECTION INTRAMUSCULAR; INTRAVENOUS; SUBCUTANEOUS at 07:19

## 2024-02-02 RX ADMIN — HEPARIN SODIUM 5000 UNIT(S): 5000 INJECTION INTRAVENOUS; SUBCUTANEOUS at 06:23

## 2024-02-02 RX ADMIN — PANTOPRAZOLE SODIUM 40 MILLIGRAM(S): 20 TABLET, DELAYED RELEASE ORAL at 12:53

## 2024-02-02 NOTE — DIETITIAN INITIAL EVALUATION ADULT - NS FNS DIET ORDER
Diet, NPO:   Except Medications (02-02-24 @ 06:37)   Diet, NPO:   Except Medications (02-02-24 @ 06:37)

## 2024-02-02 NOTE — DISCHARGE NOTE PROVIDER - HOSPITAL COURSE
77yo Female pt with PMH GERD and diverticulosis with recent diverticulitis within last year treated with abx (per chart review also PSVT, hepatitis C), PSHx hip surgery (2 years ago s/p mechanical fall) recently admitted on 1/18 for concerns of choledocholithiasis with concomitant acute cholecystitis POD#6 pw worsening lower abd pain over the course of the last 6 d passing gas and stools, reports that pain is increasing in severity with no provoking or modifying factors.    2/2: NPO pending final read. MRCP final read with enhancement of CBD and central intrahepatic biliary ducts suggestive of cholangitis, no choledocholithiasis, likely distal CBD stenosis. Diet to low fat,   ON: MRCP done, pending read. cameron diet -n/-v   2/1: AM labs WBC 10.01(11.42), T bili 1.0(1.8), alk phos 415(527), AST/(321)/190(322), bili direct .4(1.3) ___   ON:  s/p ambulating. Repeat HR 95. MRCP pending. Zosyn ordered. SQH started. CLD. Missed void  1/31: admit. concern for cholangitis; retained stone in CBD    INCOMPLETE************* 79yo Female pt with PMH GERD and diverticulosis with recent diverticulitis within last year treated with antibiotics (per chart review also PSVT, hepatitis C), PSHx hip surgery (2 years ago s/p mechanical fall) recently admitted on 1/18 for concerns of choledocholithiasis with concomitant acute cholecystitis POD#6 presenting with worsening lower abd pain over the course of the last 6 d passing gas and stools, reports that pain is increasing in severity with no provoking or modifying factors. The patient was admitted on 1/31 with concern for cholangitis. 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 On day of discharge the patient was tolerating her diet without nausea and vomiting, ambulating independently, her pain was well controlled, and she was stable for discharge home.     79yo Female pt with PMH GERD and diverticulosis with recent diverticulitis within last year treated with antibiotics (per chart review also PSVT, hepatitis C), PSHx hip surgery (2 years ago s/p mechanical fall) recently admitted on 1/18 for concerns of choledocholithiasis with concomitant acute cholecystitis POD#6 presenting with worsening lower abdominal pain over the course of the last 6 days she reports that she is passing gas and stools, reports that pain is increasing in severity with no provoking or modifying factors. The patient was admitted on 1/31 with concern for cholangitis. 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 On day of discharge the patient was tolerating her diet without nausea and vomiting, ambulating independently, her pain was well controlled, and she was stable for discharge home with oral antibiotics.     77yo Female pt with PMH GERD and diverticulosis with recent diverticulitis within last year treated with antibiotics (per chart review also PSVT, hepatitis C), PSHx hip surgery (2 years ago s/p mechanical fall) recently admitted on 1/18 for concerns of choledocholithiasis with concomitant acute cholecystitis POD#6 presenting with worsening lower abdominal pain over the course of the last 6 days she reports that she is passing gas and stools, reports that pain is increasing in severity with no provoking or modifying factors. The patient was admitted on 1/31 with concern for cholangitis. 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 On day of discharge the patient was tolerating her diet without nausea and vomiting, ambulating independently, her pain was well controlled, and she was stable for discharge home with oral antibiotics.    Patient understands  should she develop pain again or fever she should return to the hospital.  There is still a possibility of unrecognized stone or sludge that could be 3 similar clinical.    She will need ERCP upon return.  Will send her home on 1 week of antibiotics.

## 2024-02-02 NOTE — DISCHARGE NOTE PROVIDER - NSDCMRMEDTOKEN_GEN_ALL_CORE_FT
amoxicillin-clavulanate 875 mg-125 mg oral tablet: 875 milligram(s) orally 2 times a day MDD: 2 tabs  pantoprazole 40 mg oral delayed release tablet: 1 tab(s) orally once a day

## 2024-02-02 NOTE — DIETITIAN INITIAL EVALUATION ADULT - ADD RECOMMEND
1. Currently NPO. As medically feasible, consider advancing to minced and moist diet   2. Encourage and monitor PO intake, honor preferences as able  3. Monitor labs, weight trends, GI function, and skin integrity    4. Pain and bowel regimen per team   5. Monitor adherence to diet education   6. RD to remain available and follow-up  1. Currently NPO. As medically feasible, consider advancing to low fiber, low fat diet  2. Encourage and monitor PO intake, honor preferences as able  3. Monitor labs, weight trends, GI function, and skin integrity  4. Pain and bowel regimen per team  5. Monitor adherence to diet education   6. RD to remain available and follow-up

## 2024-02-02 NOTE — PROGRESS NOTE ADULT - SUBJECTIVE AND OBJECTIVE BOX
2/2: NPO pending final read.   ON: MRCP done, pending read. cameron diet -n/-v   2/1: AM labs WBC 10.01(11.42), T bili 1.0(1.8), alk phos 415(527), AST/(321)/190(322), bili direct .4(1.3) ___       SUBJECTIVE: Patient seen and examined bedside by Surgical resident. Denies any pain, n/v this am. States that she is hungry and wants to eat. otherwise has no complaints.     heparin   Injectable 5000 Unit(s) SubCutaneous every 8 hours  piperacillin/tazobactam IVPB.. 3.375 Gram(s) IV Intermittent every 8 hours    MEDICATIONS  (PRN):  ondansetron Injectable 4 milliGRAM(s) IV Push every 6 hours PRN Nausea and/or Vomiting      I&O's Detail    01 Feb 2024 07:01  -  02 Feb 2024 07:00  --------------------------------------------------------  IN:    Oral Fluid: 580 mL    sodium chloride 0.9%: 200 mL  Total IN: 780 mL    OUT:    Voided (mL): 2050 mL  Total OUT: 2050 mL    Total NET: -1270 mL      02 Feb 2024 07:01  -  02 Feb 2024 09:18  --------------------------------------------------------  IN:    sodium chloride 0.9%: 180 mL  Total IN: 180 mL    OUT:    Oral Fluid: 0 mL  Total OUT: 0 mL    Total NET: 180 mL          Vital Signs Last 24 Hrs  T(C): 36.7 (02 Feb 2024 08:35), Max: 36.9 (02 Feb 2024 05:25)  T(F): 98.1 (02 Feb 2024 08:35), Max: 98.5 (02 Feb 2024 05:25)  HR: 73 (02 Feb 2024 08:35) (73 - 95)  BP: 121/67 (02 Feb 2024 08:35) (99/63 - 121/67)  BP(mean): --  RR: 17 (02 Feb 2024 08:35) (16 - 18)  SpO2: 97% (02 Feb 2024 08:35) (92% - 97%)    Parameters below as of 02 Feb 2024 08:35  Patient On (Oxygen Delivery Method): room air        General: NAD, resting comfortably in bed  C/V: NSR  Pulm: Nonlabored breathing, no respiratory distress  Abd: soft, NT/ND  Extrem: WWP, no edema, SCDs in place    LABS:                        13.0   9.29  )-----------( 343      ( 02 Feb 2024 05:30 )             39.9     02-02    139  |  104  |  7   ----------------------------<  102<H>  4.2   |  29  |  0.84    Ca    8.9      02 Feb 2024 05:30  Phos  3.6     02-02  Mg     2.1     02-02    TPro  6.2  /  Alb  3.2<L>  /  TBili  0.6  /  DBili  0.3  /  AST  53<H>  /  ALT  125<H>  /  AlkPhos  344<H>  02-02      Urinalysis Basic - ( 02 Feb 2024 05:30 )    Color: x / Appearance: x / SG: x / pH: x  Gluc: 102 mg/dL / Ketone: x  / Bili: x / Urobili: x   Blood: x / Protein: x / Nitrite: x   Leuk Esterase: x / RBC: x / WBC x   Sq Epi: x / Non Sq Epi: x / Bacteria: x        RADIOLOGY & ADDITIONAL STUDIES:  CT Abdomen and Pelvis w/ Oral Cont and w/ IV Cont:   ACC: [id number] EXAM:  CT ABDOMEN AND PELVIS OC IC   ORDERED BY: DUNG LIN     PROCEDURE DATE:  01/31/2024          INTERPRETATION:  CLINICAL INFORMATION: 78-year-old female. Lower   abdominal pain.    COMPARISON: CT abdomen pelvis 1/18/2024.    CONTRAST/COMPLICATIONS:  IV Contrast: Omnipaque 350  96 cc administered   4 cc discarded  Oral Contrast: Administered  Complications: None    PROCEDURE:  CT of the Abdomen and Pelvis was performed.  Sagittal and coronal reformats were performed.    FINDINGS:  LOWER CHEST: There is suggestion of a tiny hiatal hernia..    LIVER: Within normal limits.  BILE DUCTS: Minimal intrahepatic biliary dilatation. The common bile duct   is dilated as before measuring up to about 1.4 cm common bile duct wall   thickening-as before.  GALLBLADDER: Status post interval cholecystectomy. No fluid collection in   the gallbladder fossa.  SPLEEN: Within normal limits.  PANCREAS: Within normal limits.  ADRENALS: Within normal limits.  KIDNEYS/URETERS: Within normal limits.    BLADDER: Within normal limits.  REPRODUCTIVE ORGANS: Lobulated calcified multi fibroid uterus measuring   8.4 x 9.8 x 6.5 cm. Ovaries normal in size. Possible 1.3 cm right ovarian   cyst. Possible 1.0 cm left ovarian cyst.    BOWEL: Theingested oral contrast material has reached the upper   ascending colon. No bowel obstruction. Colon loaded with fecal material.   No evidence of colitis or colonic diverticulitis. Scattered colonic   diverticula-particularly sigmoid. Normal appendix.  PERITONEUM: No ascites.  VESSELS: Within normal limits.  RETROPERITONEUM/LYMPH NODES: No lymphadenopathy.  ABDOMINAL WALL: Postop changes at the umbilicus.  BONES: Left proximal femoral Jin pins. Lumbar levoscoliosis.   Degenerative disc disease L2-L3 and L5-S1.    IMPRESSION:  Status post interval cholecystectomy as compared to CT 1/18/2024.  Unchanged dilated common bile duct with wall thickening which could   represent cholangitis.  No evidence of colitis or colonic diverticulitis.        --- End of Report ---            AVIVA MELGAR MD; Attending Radiologist  This document has been electronically signed. Jan 31 2024 10:27AM (01-31-24 @ 10:10)

## 2024-02-02 NOTE — DISCHARGE NOTE PROVIDER - NSDCFUADDINST_GEN_ALL_CORE_FT
Please follow up with Dr. Magallon in clinic; you may call the office to make an appointment at your earliest convenience.   Please take your newly prescribed antibiotics 2x per day for 1 week total. Please take your antibiotics until completion.     Warning Signs:  Please call your doctor or nurse practitioner if you experience the following:  *You experience new chest pain, pressure, squeezing or tightness.  *New or worsening cough, shortness of breath, or wheeze.  *If you are vomiting and cannot keep down fluids or your medications.  *You are getting dehydrated due to continued vomiting, diarrhea, or other reasons. Signs of dehydration include dry mouth, rapid heartbeat, or feeling dizzy or faint when standing.  *You see blood or dark/black material when you vomit or have a bowel movement.  *You experience burning when you urinate, have blood in your urine, or experience a discharge.  *Your pain is not improving within 8-12 hours or is not gone within 24 hours. Call or return immediately if your pain is getting worse, changes location, or moves to your chest or back.  *You have shaking chills, or fever greater than 101.5 degrees Fahrenheit or 38 degrees Celsius.  *Any change in your symptoms, or any new symptoms that concern you.

## 2024-02-02 NOTE — DIETITIAN INITIAL EVALUATION ADULT - OTHER CALCULATIONS
Based on Standards of Care pt within % Ideal Body Weight ( 125 pounds, 92% ideal body weight) thus actual body weight used for all calculations. Needs adjusted for advanced age, malnutrition and clinical condition.  Based on Standards of Care pt within % Ideal Body Weight ( 125 pounds, 92% ideal body weight) thus actual body weight used for all calculations. Needs adjusted for advanced age, malnutrition and clinical condition.

## 2024-02-02 NOTE — DISCHARGE NOTE NURSING/CASE MANAGEMENT/SOCIAL WORK - PATIENT PORTAL LINK FT
You can access the FollowMyHealth Patient Portal offered by [facility] by registering at the following website: [email or web address]. By joining [facility]’s FollowMyHealth portal, you will also be able to view your health information using other applications (apps) compatible with our system.

## 2024-02-02 NOTE — DIETITIAN INITIAL EVALUATION ADULT - PERTINENT MEDS FT
MEDICATIONS  (STANDING):  heparin   Injectable 5000 Unit(s) SubCutaneous every 8 hours  pantoprazole  Injectable 40 milliGRAM(s) IV Push daily  piperacillin/tazobactam IVPB.. 3.375 Gram(s) IV Intermittent every 8 hours  sodium chloride 0.9%. 1000 milliLiter(s) (90 mL/Hr) IV Continuous <Continuous>    MEDICATIONS  (PRN):  ondansetron Injectable 4 milliGRAM(s) IV Push every 6 hours PRN Nausea and/or Vomiting

## 2024-02-02 NOTE — DIETITIAN INITIAL EVALUATION ADULT - SIGNS/SYMPTOMS
as evidenced by NPO diet status, and 4.2% weight loss x 2 weeks as evidenced by <50% PO intake x 5days, and 4.2% weight loss x 2 weeks

## 2024-02-02 NOTE — PROGRESS NOTE ADULT - ASSESSMENT
77yo Female pt with PMH GERD and diverticulosis with recent diverticulitis within last year treated with abx (per chart review also PSVT, hepatitis C), PSHx hip surgery (2 years ago s/p mechanical fall) s/p cholecystectomy POD#6. Transfer from [facility] for  pw worsening lower abd pain. r/o cholangitis.    CLD/IVF  Zosyn  MRCP ordered  home meds  SQH/IS/OOBA  pain/ nausea control
77yo Female pt with PMH GERD and diverticulosis with recent diverticulitis within last year treated with abx (per chart review also PSVT, hepatitis C), PSHx hip surgery (2 years ago s/p mechanical fall) s/p cholecystectomy POD#6. Transfer from [facility] for  pw worsening lower abd pain. r/o cholangitis.    NPO/IVF  Zosyn  Home meds  SQH/IS/OOBA  pain/ nausea control  pending final MRCP read

## 2024-02-02 NOTE — DIETITIAN INITIAL EVALUATION ADULT - PERTINENT LABORATORY DATA
02-02    139  |  104  |  7   ----------------------------<  102<H>  4.2   |  29  |  0.84    Ca    8.9      02 Feb 2024 05:30  Phos  3.6     02-02  Mg     2.1     02-02    TPro  6.2  /  Alb  3.2<L>  /  TBili  0.6  /  DBili  0.3  /  AST  53<H>  /  ALT  125<H>  /  AlkPhos  344<H>  02-02

## 2024-02-02 NOTE — DIETITIAN NUTRITION RISK NOTIFICATION - ADDITIONAL COMMENTS/DIETITIAN RECOMMENDATIONS
Malnutrition...Moderate malnutrition related to recent surgery and clinical complications as evidenced by <50% PO intake x 5days, and 4.2% weight loss x 2 weeks  goals: Pt to consistently meet at least 75% of EEE via tolerated route that is consistent with goals of care during hospital stay; reduce/resolve signs and symptoms of protein calorie malnutrition    1. Currently NPO. As medically feasible, consider advancing to low fiber, low fat diet  2. Encourage and monitor PO intake, honor preferences as able  3. Monitor labs, weight trends, GI function, and skin integrity  4. Pain and bowel regimen per team  5. Monitor adherence to diet education   6. RD to remain available and follow-up

## 2024-02-02 NOTE — DIETITIAN INITIAL EVALUATION ADULT - OTHER INFO
77yo Female pt with PMH GERD and diverticulosis with recent diverticulitis within last year treated with abx, PSHx hip surgery, and most recently s/p cholecystectomy (1/25). Presented to [facility] for complaint of worsening lower abd pain over the course of the last 6 d passing gas and stools, reports that pain is increasing in severity with no provoking or modifying factors.   Patient seen at bedside for _____assessment. Currently on ____ diet. No known food allergies. No difficulty chewing/swallowing reported. Reports ____appetite. Patient consumed ____% of breakfast which included ______. PTA, patient’s usual meals consisted of__________. Dosing weight: ____pounds, BMI ____, reports UBW of ____ pounds. Ideal Body weight ______, _____%. No pressure injuries documented. No edema documented. Denies nausea, comiting, diarrhea, constipation. Labs:____ Meds: _____. Observed with no overt signs and symptoms of muscle or fat wasting. Based on ASPEN guidelines, patient does not meet criteria for malnutrition. No cultural, ethnic, Quaker food preferences noted. See nutrition recommendations below.     Nutrition education  77yo Female pt with PMH GERD and diverticulosis with recent diverticulitis within last year treated with abx, PSHx hip surgery, and most recently s/p cholecystectomy (1/25). Presented to [facility] for complaint of worsening lower abd pain over the course of the last 6 d passing gas and stools, reports that pain is increasing in severity with no provoking or modifying factors.      Patient seen at bedside for nutrition assessment. No known food allergies, prefers not to eat fish. No difficulty chewing/swallowing reported. Reports good appetite. PTA, patient reports a good appetite with meals including yogurt and granola, low-fat cottage cheese on toast, chicken soup, vegetables like cauliflower, and string beans. Reports having lost 5 pounds since her cholecystectomy, ~ two weeks back. Currently NPO. With ideal body weight of 125 pounds, dosing weight at 125 pounds is 92.5% of ideal body weight. There is a  4.2% weight loss in 2 weeks. Her usual body weight is 124 pounds, BMI 19.8.  No pressure injuries or edema documented. Denies nausea, vomiting, diarrhea, or constipation. Last bowel movement on1/31. Patient has a history of diverticulitis, GERD and constipation. GI tips and handouts provided pertaining to the mentioned issues.  Observed with no overt signs and symptoms of muscle or fat wasting. Based on ASPEN guidelines, patient meets the criteria for malnutrition. Labs: Glucose 102, LFT indices high. Medications: antibiotics, ondansetron, pantoprazole. No cultural, ethnic, Caodaism food preferences noted. RD to remain available _____  77yo Female pt with PMH GERD and diverticulosis with recent diverticulitis within last year treated with abx, PSHx hip surgery, and most recently s/p cholecystectomy (1/25). Presented to [facility] for complaint of worsening lower abd pain over the course of the last 6 d passing gas and stools, reports that pain is increasing in severity with no provoking or modifying factors.      Patient seen at bedside for nutrition assessment. No known food allergies, prefers not to eat fish. No difficulty chewing/swallowing reported. Reports good appetite. PTA, patient reports a good appetite with meals including yogurt and granola, low-fat cottage cheese on toast, chicken soup, vegetables like cauliflower, and string beans. Reports having lost 5 pounds since her cholecystectomy, ~ two weeks back. Currently NPO. Reported<50% PO intake x 5days. With ideal body weight of 125 pounds, dosing weight at 125 pounds is 92.5% of ideal body weight. This is a significant weight loss (4.2% x 2 weeks). Her usual body weight is 124 pounds, BMI 19.8.  No pressure injuries or edema documented. Denies nausea, vomiting, diarrhea, or constipation. Last bowel movement on1/31. Patient has a history of diverticulitis, GERD and constipation. GI tips and handouts provided pertaining to the mentioned issues.  Observed with no overt signs and symptoms of muscle or fat wasting. Based on ASPEN guidelines, patient meets the criteria for malnutrition. Labs: Glucose 102, LFT indices high. Medications: antibiotics, ondansetron, pantoprazole. No cultural, ethnic, Methodist food preferences noted. RD to remain available and follow up. 77yo Female pt with PMH GERD and diverticulosis with recent diverticulitis within last year treated with abx, PSHx hip surgery, and most recently s/p cholecystectomy (1/25). Presented to [facility] for complaint of worsening lower abd pain over the course of the last 6 d passing gas and stools, reports that pain is increasing in severity with no provoking or modifying factors.    Patient seen at bedside for nutrition assessment. No known food allergies, prefers not to eat fish. No difficulty chewing/swallowing reported. Reports good appetite. PTA, patient reports a good appetite with meals including yogurt and granola, low-fat cottage cheese on toast, chicken soup, vegetables like cauliflower, and string beans. Reports having lost 5 pounds since her cholecystectomy, ~ two weeks back. Currently NPO. Reported<50% PO intake x 5days. With ideal body weight of 125 pounds, dosing weight at 125 pounds is 92.5% of ideal body weight. This is a significant weight loss (4.2% x 2 weeks). Her usual body weight is 124 pounds, BMI 19.8. No pressure injuries or edema documented. Denies nausea, vomiting, diarrhea, or constipation. Last bowel movement on1/31. Patient has a history of diverticulitis, GERD and constipation. GI tips and handouts provided pertaining to the mentioned issues.  Observed with no overt signs and symptoms of muscle or fat wasting. Based on ASPEN guidelines, patient meets the criteria for malnutrition. Labs: Glucose 102, LFT indices high. Medications: antibiotics, ondansetron, pantoprazole. No cultural, ethnic, Rastafari food preferences noted. RD to remain available and follow up.

## 2024-02-02 NOTE — DIETITIAN INITIAL EVALUATION ADULT - NUTRITIONGOAL OUTCOME1
Reduce/resolve signs and symptoms of protein calorie malnutrition  Reduce/resolve signs and symptoms of protein calorie malnutrition

## 2024-02-02 NOTE — DISCHARGE NOTE PROVIDER - NSDCACTIVITY_GEN_ALL_CORE
No restrictions No restrictions/Showering allowed/Stairs allowed/Walking - Indoors allowed/Walking - Outdoors allowed/Follow Instructions Provided by your Surgical Team

## 2024-02-02 NOTE — DISCHARGE NOTE PROVIDER - CARE PROVIDER_API CALL
Danielito Magallon  Surgical Oncology  [address] [address]  Phone: [phone]  Fax: [phone]  Established Patient  Follow Up Time: Routine

## 2024-02-02 NOTE — DIETITIAN INITIAL EVALUATION ADULT - NAME AND PHONE
Pallavi Sharma (dietetic intern)  Pallavi Sharma (dietetic intern) ... Leigh Brown, RDN, CDN, ext [phone]

## 2024-02-02 NOTE — DISCHARGE NOTE NURSING/CASE MANAGEMENT/SOCIAL WORK - NSDCPEFALRISK_GEN_ALL_CORE
For information on Fall & Injury Prevention, visit: [email or web address] OR  [email or web address] OR  https://www.cdc.gov/steadi/patient.html

## 2024-02-02 NOTE — DIETITIAN INITIAL EVALUATION ADULT - FUNCTIONAL SCREEN CURRENT LEVEL: SWALLOWING (IF SCORE 2 OR MORE FOR ANY ITEM, CONSULT REHAB SERVICES), MLM)
0 = swallows foods/liquids without difficulty Neurologic:  Dilaudid PRN    Respiratory:  Extubated     Cardiovascular:  Levo dc'd  s.p Albumin 5% 500cc x1  B/l radial aterial lines      Gastrointestinal/Nutrition:  Monitor VIOLETA output x 3  liver US with doppler- neg  NPO, NGT in placed confirmed on CXR  Bactrim/ Diflucan/ Valcyte per transplant  Cellcept/ Tacro per transplant  Protonix  Bowel regimen    Genitourinary/Renal:  Monitor UO  Maintain indwelling chavez  Supplement electrolytes PRN    Hematologic:  S/p 1PRBC, 2 FFP  q6 CBC    Infectious Disease:  Unasyn 3g q6hrs x 48 hours  diflucan  bactrim  valgancyclovir  Endocrine:  ISS     32M PMH Cerebral palsy, Primary Sclerosing Cholangitis Cirrhosis with frequent ERCPs with biliary stent placements for biliary strictures (last in 9/2022) on liver transplant list (ABO AB), recent COVID s/p MAB in 9/2022), on POD2 s/p liver transplant.    Neurologic:  - Pain control w/ Dilaudid PRN    Respiratory:  - Extubated  - Incentive spirometry    Cardiovascular:  - Levo dc'd  - Sinus tachycardia 11/2 AM, s/p 500cc NS bolus    Gastrointestinal/Nutrition:  - Monitor VIOLETA output x 3  - liver US with doppler showing patent anastamoses and good flow  - NGT to be removed on POD3 due to pt's le en Y gastric bypass  - NPO w/ sips and chips  - Bactrim/ Diflucan/ Valcyte per transplant  - Cellcept/ Tacro per transplant  - Protonix  - Bowel regimen    Genitourinary/Renal:  - Monitor UO  - Chapman removed 11/2  - Supplement electrolytes PRN    Hematologic:  -q12 CBC  -Transfuse PRN    Infectious Disease:  - Febrile to 102.4, blood and urine cx sent  -Unasyn 3g q6hrs x 48 hours  -diflucan/bactrim/valgancyclovir for prophylaxis    Endocrine:  -ISS

## 2024-02-02 NOTE — DISCHARGE NOTE PROVIDER - NSDCFUSCHEDAPPT_GEN_ALL_CORE_FT
[facility]  [facility] [address]  Scheduled Appointment: 02/06/2024     [facility]  [facility] [address]  Scheduled Appointment: 02/13/2024

## 2024-02-03 LAB
-  CEFTRIAXONE: SIGNIFICANT CHANGE UP
-  PENICILLIN: SIGNIFICANT CHANGE UP
-  VANCOMYCIN: SIGNIFICANT CHANGE UP
CULTURE RESULTS: ABNORMAL
CULTURE RESULTS: ABNORMAL
METHOD TYPE: SIGNIFICANT CHANGE UP
ORGANISM # SPEC MICROSCOPIC CNT: ABNORMAL
ORGANISM # SPEC MICROSCOPIC CNT: ABNORMAL
ORGANISM # SPEC MICROSCOPIC CNT: SIGNIFICANT CHANGE UP
ORGANISM # SPEC MICROSCOPIC CNT: SIGNIFICANT CHANGE UP
SPECIMEN SOURCE: SIGNIFICANT CHANGE UP
SPECIMEN SOURCE: SIGNIFICANT CHANGE UP

## 2024-02-03 RX ORDER — CEFPODOXIME PROXETIL 100 MG
1 TABLET ORAL
Qty: 20 | Refills: 0
Start: 2024-02-03 | End: 2024-02-12

## 2024-02-06 ENCOUNTER — APPOINTMENT (OUTPATIENT)
Dept: SURGICAL ONCOLOGY | Facility: CLINIC | Age: 79
End: 2024-02-06

## 2024-02-06 DIAGNOSIS — K80.42 CALCULUS OF BILE DUCT WITH ACUTE CHOLECYSTITIS WITHOUT OBSTRUCTION: ICD-10-CM

## 2024-02-06 DIAGNOSIS — K21.9 GASTRO-ESOPHAGEAL REFLUX DISEASE WITHOUT ESOPHAGITIS: ICD-10-CM

## 2024-02-07 ENCOUNTER — NON-APPOINTMENT (OUTPATIENT)
Age: 79
End: 2024-02-07

## 2024-02-07 DIAGNOSIS — K80.50 CALCULUS OF BILE DUCT WITHOUT CHOLANGITIS OR CHOLECYSTITIS WITHOUT OBSTRUCTION: ICD-10-CM

## 2024-02-07 DIAGNOSIS — K21.9 GASTRO-ESOPHAGEAL REFLUX DISEASE WITHOUT ESOPHAGITIS: ICD-10-CM

## 2024-02-07 DIAGNOSIS — K57.90 DIVERTICULOSIS OF INTESTINE, PART UNSPECIFIED, WITHOUT PERFORATION OR ABSCESS WITHOUT BLEEDING: ICD-10-CM

## 2024-02-08 ENCOUNTER — INPATIENT (INPATIENT)
Facility: HOSPITAL | Age: 79
LOS: 3 days | Discharge: HOME HEALTH | DRG: 871 | End: 2024-02-12
Attending: GENERAL ACUTE CARE HOSPITAL | Admitting: GENERAL ACUTE CARE HOSPITAL
Payer: MEDICARE

## 2024-02-08 VITALS
RESPIRATION RATE: 18 BRPM | OXYGEN SATURATION: 97 % | HEART RATE: 99 BPM | SYSTOLIC BLOOD PRESSURE: 93 MMHG | WEIGHT: 110.01 LBS | TEMPERATURE: 98 F | DIASTOLIC BLOOD PRESSURE: 55 MMHG | HEIGHT: 65 IN

## 2024-02-08 DIAGNOSIS — K21.9 GASTRO-ESOPHAGEAL REFLUX DISEASE WITHOUT ESOPHAGITIS: ICD-10-CM

## 2024-02-08 DIAGNOSIS — R78.81 BACTEREMIA: ICD-10-CM

## 2024-02-08 DIAGNOSIS — Z90.49 ACQUIRED ABSENCE OF OTHER SPECIFIED PARTS OF DIGESTIVE TRACT: ICD-10-CM

## 2024-02-08 DIAGNOSIS — R63.8 OTHER SYMPTOMS AND SIGNS CONCERNING FOOD AND FLUID INTAKE: ICD-10-CM

## 2024-02-08 DIAGNOSIS — A41.9 SEPSIS, UNSPECIFIED ORGANISM: ICD-10-CM

## 2024-02-08 DIAGNOSIS — R19.7 DIARRHEA, UNSPECIFIED: ICD-10-CM

## 2024-02-08 LAB
ALBUMIN SERPL ELPH-MCNC: 3 G/DL — LOW (ref 3.3–5)
ALBUMIN SERPL ELPH-MCNC: 3.4 G/DL — SIGNIFICANT CHANGE UP (ref 3.3–5)
ALP SERPL-CCNC: 329 U/L — HIGH (ref 40–120)
ALP SERPL-CCNC: 433 U/L — HIGH (ref 40–120)
ALT FLD-CCNC: 103 U/L — HIGH (ref 10–45)
ALT FLD-CCNC: 77 U/L — HIGH (ref 10–45)
ANION GAP SERPL CALC-SCNC: 11 MMOL/L — SIGNIFICANT CHANGE UP (ref 5–17)
ANION GAP SERPL CALC-SCNC: 14 MMOL/L — SIGNIFICANT CHANGE UP (ref 5–17)
ANISOCYTOSIS BLD QL: SLIGHT — SIGNIFICANT CHANGE UP
AST SERPL-CCNC: 36 U/L — SIGNIFICANT CHANGE UP (ref 10–40)
AST SERPL-CCNC: 63 U/L — HIGH (ref 10–40)
BASOPHILS # BLD AUTO: 0 K/UL — SIGNIFICANT CHANGE UP (ref 0–0.2)
BASOPHILS NFR BLD AUTO: 0 % — SIGNIFICANT CHANGE UP (ref 0–2)
BILIRUB SERPL-MCNC: 0.4 MG/DL — SIGNIFICANT CHANGE UP (ref 0.2–1.2)
BILIRUB SERPL-MCNC: 0.6 MG/DL — SIGNIFICANT CHANGE UP (ref 0.2–1.2)
BUN SERPL-MCNC: 11 MG/DL — SIGNIFICANT CHANGE UP (ref 7–23)
BUN SERPL-MCNC: 11 MG/DL — SIGNIFICANT CHANGE UP (ref 7–23)
C DIFF BY PCR RESULT: POSITIVE
C DIFF GDH STL QL: SIGNIFICANT CHANGE UP
C DIFF GDH STL QL: SIGNIFICANT CHANGE UP
CALCIUM SERPL-MCNC: 8.4 MG/DL — SIGNIFICANT CHANGE UP (ref 8.4–10.5)
CALCIUM SERPL-MCNC: 8.6 MG/DL — SIGNIFICANT CHANGE UP (ref 8.4–10.5)
CHLORIDE SERPL-SCNC: 103 MMOL/L — SIGNIFICANT CHANGE UP (ref 96–108)
CHLORIDE SERPL-SCNC: 104 MMOL/L — SIGNIFICANT CHANGE UP (ref 96–108)
CO2 SERPL-SCNC: 21 MMOL/L — LOW (ref 22–31)
CO2 SERPL-SCNC: 21 MMOL/L — LOW (ref 22–31)
CREAT SERPL-MCNC: 0.79 MG/DL — SIGNIFICANT CHANGE UP (ref 0.5–1.3)
CREAT SERPL-MCNC: 1.03 MG/DL — SIGNIFICANT CHANGE UP (ref 0.5–1.3)
EGFR: 56 ML/MIN/1.73M2 — LOW
EGFR: 77 ML/MIN/1.73M2 — SIGNIFICANT CHANGE UP
EOSINOPHIL # BLD AUTO: 0 K/UL — SIGNIFICANT CHANGE UP (ref 0–0.5)
EOSINOPHIL NFR BLD AUTO: 0 % — SIGNIFICANT CHANGE UP (ref 0–6)
GI PCR PANEL: SIGNIFICANT CHANGE UP
GIANT PLATELETS BLD QL SMEAR: PRESENT — SIGNIFICANT CHANGE UP
GLUCOSE SERPL-MCNC: 124 MG/DL — HIGH (ref 70–99)
GLUCOSE SERPL-MCNC: 125 MG/DL — HIGH (ref 70–99)
HCT VFR BLD CALC: 37.6 % — SIGNIFICANT CHANGE UP (ref 34.5–45)
HGB BLD-MCNC: 12.5 G/DL — SIGNIFICANT CHANGE UP (ref 11.5–15.5)
LACTATE SERPL-SCNC: 2.7 MMOL/L — HIGH (ref 0.5–2)
LACTATE SERPL-SCNC: 4.2 MMOL/L — CRITICAL HIGH (ref 0.5–2)
LIDOCAIN IGE QN: 12 U/L — SIGNIFICANT CHANGE UP (ref 7–60)
LYMPHOCYTES # BLD AUTO: 2.14 K/UL — SIGNIFICANT CHANGE UP (ref 1–3.3)
LYMPHOCYTES # BLD AUTO: 6.9 % — LOW (ref 13–44)
MACROCYTES BLD QL: SLIGHT — SIGNIFICANT CHANGE UP
MAGNESIUM SERPL-MCNC: 2 MG/DL — SIGNIFICANT CHANGE UP (ref 1.6–2.6)
MANUAL SMEAR VERIFICATION: SIGNIFICANT CHANGE UP
MCHC RBC-ENTMCNC: 30.8 PG — SIGNIFICANT CHANGE UP (ref 27–34)
MCHC RBC-ENTMCNC: 33.2 GM/DL — SIGNIFICANT CHANGE UP (ref 32–36)
MCV RBC AUTO: 92.6 FL — SIGNIFICANT CHANGE UP (ref 80–100)
MICROCYTES BLD QL: SLIGHT — SIGNIFICANT CHANGE UP
MONOCYTES # BLD AUTO: 2.17 K/UL — HIGH (ref 0–0.9)
MONOCYTES NFR BLD AUTO: 7 % — SIGNIFICANT CHANGE UP (ref 2–14)
NEUTROPHILS # BLD AUTO: 26.73 K/UL — HIGH (ref 1.8–7.4)
NEUTROPHILS NFR BLD AUTO: 86.1 % — HIGH (ref 43–77)
PLAT MORPH BLD: ABNORMAL
PLATELET # BLD AUTO: 322 K/UL — SIGNIFICANT CHANGE UP (ref 150–400)
POLYCHROMASIA BLD QL SMEAR: SLIGHT — SIGNIFICANT CHANGE UP
POTASSIUM SERPL-MCNC: 3.6 MMOL/L — SIGNIFICANT CHANGE UP (ref 3.5–5.3)
POTASSIUM SERPL-MCNC: 4.2 MMOL/L — SIGNIFICANT CHANGE UP (ref 3.5–5.3)
POTASSIUM SERPL-SCNC: 3.6 MMOL/L — SIGNIFICANT CHANGE UP (ref 3.5–5.3)
POTASSIUM SERPL-SCNC: 4.2 MMOL/L — SIGNIFICANT CHANGE UP (ref 3.5–5.3)
PROT SERPL-MCNC: 5.7 G/DL — LOW (ref 6–8.3)
PROT SERPL-MCNC: 6.6 G/DL — SIGNIFICANT CHANGE UP (ref 6–8.3)
RBC # BLD: 4.06 M/UL — SIGNIFICANT CHANGE UP (ref 3.8–5.2)
RBC # FLD: 13.3 % — SIGNIFICANT CHANGE UP (ref 10.3–14.5)
RBC BLD AUTO: ABNORMAL
SODIUM SERPL-SCNC: 135 MMOL/L — SIGNIFICANT CHANGE UP (ref 135–145)
SODIUM SERPL-SCNC: 139 MMOL/L — SIGNIFICANT CHANGE UP (ref 135–145)
WBC # BLD: 31.04 K/UL — HIGH (ref 3.8–10.5)
WBC # FLD AUTO: 31.04 K/UL — HIGH (ref 3.8–10.5)

## 2024-02-08 PROCEDURE — 99285 EMERGENCY DEPT VISIT HI MDM: CPT

## 2024-02-08 PROCEDURE — 99222 1ST HOSP IP/OBS MODERATE 55: CPT

## 2024-02-08 PROCEDURE — 74019 RADEX ABDOMEN 2 VIEWS: CPT | Mod: 26

## 2024-02-08 PROCEDURE — 99223 1ST HOSP IP/OBS HIGH 75: CPT | Mod: GC,AI

## 2024-02-08 RX ORDER — MULTIVIT-MIN/FERROUS GLUCONATE 9 MG/15 ML
1 LIQUID (ML) ORAL DAILY
Refills: 0 | Status: DISCONTINUED | OUTPATIENT
Start: 2024-02-08 | End: 2024-02-12

## 2024-02-08 RX ORDER — CEFTRIAXONE 500 MG/1
1000 INJECTION, POWDER, FOR SOLUTION INTRAMUSCULAR; INTRAVENOUS EVERY 24 HOURS
Refills: 0 | Status: DISCONTINUED | OUTPATIENT
Start: 2024-02-09 | End: 2024-02-12

## 2024-02-08 RX ORDER — SODIUM CHLORIDE 9 MG/ML
1000 INJECTION, SOLUTION INTRAVENOUS ONCE
Refills: 0 | Status: COMPLETED | OUTPATIENT
Start: 2024-02-08 | End: 2024-02-08

## 2024-02-08 RX ORDER — ENOXAPARIN SODIUM 100 MG/ML
30 INJECTION SUBCUTANEOUS EVERY 24 HOURS
Refills: 0 | Status: DISCONTINUED | OUTPATIENT
Start: 2024-02-08 | End: 2024-02-12

## 2024-02-08 RX ORDER — VANCOMYCIN HCL 1 G
125 VIAL (EA) INTRAVENOUS EVERY 6 HOURS
Refills: 0 | Status: DISCONTINUED | OUTPATIENT
Start: 2024-02-08 | End: 2024-02-12

## 2024-02-08 RX ORDER — SODIUM CHLORIDE 9 MG/ML
1000 INJECTION, SOLUTION INTRAVENOUS
Refills: 0 | Status: DISCONTINUED | OUTPATIENT
Start: 2024-02-08 | End: 2024-02-09

## 2024-02-08 RX ORDER — CEFTRIAXONE 500 MG/1
1000 INJECTION, POWDER, FOR SOLUTION INTRAMUSCULAR; INTRAVENOUS EVERY 24 HOURS
Refills: 0 | Status: DISCONTINUED | OUTPATIENT
Start: 2024-02-08 | End: 2024-02-08

## 2024-02-08 RX ORDER — ACETAMINOPHEN 500 MG
750 TABLET ORAL ONCE
Refills: 0 | Status: COMPLETED | OUTPATIENT
Start: 2024-02-08 | End: 2024-02-08

## 2024-02-08 RX ORDER — ONDANSETRON 8 MG/1
4 TABLET, FILM COATED ORAL EVERY 8 HOURS
Refills: 0 | Status: DISCONTINUED | OUTPATIENT
Start: 2024-02-08 | End: 2024-02-12

## 2024-02-08 RX ORDER — PANTOPRAZOLE SODIUM 20 MG/1
40 TABLET, DELAYED RELEASE ORAL
Refills: 0 | Status: DISCONTINUED | OUTPATIENT
Start: 2024-02-08 | End: 2024-02-12

## 2024-02-08 RX ORDER — LANOLIN ALCOHOL/MO/W.PET/CERES
3 CREAM (GRAM) TOPICAL AT BEDTIME
Refills: 0 | Status: DISCONTINUED | OUTPATIENT
Start: 2024-02-08 | End: 2024-02-12

## 2024-02-08 RX ORDER — CEFTRIAXONE 500 MG/1
1000 INJECTION, POWDER, FOR SOLUTION INTRAMUSCULAR; INTRAVENOUS ONCE
Refills: 0 | Status: COMPLETED | OUTPATIENT
Start: 2024-02-08 | End: 2024-02-08

## 2024-02-08 RX ORDER — ACETAMINOPHEN 500 MG
650 TABLET ORAL EVERY 6 HOURS
Refills: 0 | Status: DISCONTINUED | OUTPATIENT
Start: 2024-02-08 | End: 2024-02-12

## 2024-02-08 RX ADMIN — SODIUM CHLORIDE 1000 MILLILITER(S): 9 INJECTION, SOLUTION INTRAVENOUS at 16:26

## 2024-02-08 RX ADMIN — CEFTRIAXONE 100 MILLIGRAM(S): 500 INJECTION, POWDER, FOR SOLUTION INTRAMUSCULAR; INTRAVENOUS at 15:20

## 2024-02-08 RX ADMIN — Medication 125 MILLIGRAM(S): at 19:33

## 2024-02-08 RX ADMIN — ENOXAPARIN SODIUM 30 MILLIGRAM(S): 100 INJECTION SUBCUTANEOUS at 21:33

## 2024-02-08 RX ADMIN — Medication 650 MILLIGRAM(S): at 23:40

## 2024-02-08 RX ADMIN — Medication 125 MILLIGRAM(S): at 00:00

## 2024-02-08 RX ADMIN — SODIUM CHLORIDE 1000 MILLILITER(S): 9 INJECTION, SOLUTION INTRAVENOUS at 13:02

## 2024-02-08 RX ADMIN — SODIUM CHLORIDE 1000 MILLILITER(S): 9 INJECTION, SOLUTION INTRAVENOUS at 14:40

## 2024-02-08 RX ADMIN — SODIUM CHLORIDE 100 MILLILITER(S): 9 INJECTION, SOLUTION INTRAVENOUS at 20:26

## 2024-02-08 RX ADMIN — Medication 300 MILLIGRAM(S): at 18:00

## 2024-02-08 NOTE — ED ADULT TRIAGE NOTE - CHIEF COMPLAINT QUOTE
Patient to the ED c/o weakness and diarrhea since last night, recent lap leon x 2 weeks ago- denies fevers. SBP 90's, IV fluids infusing by EMS. Able to answer questions appropriately, AAOX4, NAD.

## 2024-02-08 NOTE — CONSULT NOTE ADULT - ASSESSMENT
78 year old female w/ recent multiple admissions for cholecystectomy and cholangitis, dc'd on PO abx, later found to have positive BCx who returns now with severe sepsis and diarrhea likely 2/2 C. Diff. BCx from 01/31 positive for several Strept species and R. Mucilaginosa, possible contaminant. Patient was dc'd on Augmentin and was called later on with change in medication to cefpodoxime 200mg BID. Patient currently reports over 20 bouts of diarrhea in past 24h.    Recommendations  - Start CTX 1g qd empirically following BCx collection for strept bacteremia  - f/u C. Diff PCR - if positive start PO vanc 78 year old female w/ recent multiple admissions for cholecystectomy and cholangitis, dc'd on PO abx, later found to have positive BCx who returns now with severe sepsis and diarrhea likely 2/2 C. Diff. BCx from 01/31 positive for several Strept species and R. Mucilaginosa, possible contaminant. Patient was dc'd on Augmentin and was called later on with change in medication to cefpodoxime 200mg BID. Patient currently reports over 20 bouts of diarrhea in past 24h.    Recommendations  - Start CTX 1g qd empirically following BCx collection for strept bacteremia  - f/u C. Diff PCR - if positive start PO vanc  - TTE

## 2024-02-08 NOTE — ED PROVIDER NOTE - CLINICAL SUMMARY MEDICAL DECISION MAKING FREE TEXT BOX
78 year old female with history of GERD, diverticulitis, PSVT, hepatitis C, recent multiple admissions for cholecystectomy, cholangitis, dc'd on PO abx, returning for diarrhea and generalized weakness x 1-2d. 78 year old female with history of GERD, diverticulitis, PSVT, hepatitis C, recent multiple admissions for cholecystectomy, cholangitis, dc'd on PO abx, returning for diarrhea and generalized weakness x 1-2d. Overall nontoxic here but hypotensive in setting of dehydration 2/2 diarrhea, abdomen benign on exam. Will need c.diff r/o in setting of recent admissions/abx use. Surgery team to eval. Anticipate likely admission.

## 2024-02-08 NOTE — PATIENT PROFILE ADULT - FALL HARM RISK - HARM RISK INTERVENTIONS

## 2024-02-08 NOTE — ED PROVIDER NOTE - PHYSICAL EXAMINATION
Gen - Nontoxic but weak appearing; A+Ox3   HEENT - NCAT, EOMI, dry mucous membranes, clear oropharynx  Neck - supple  Resp - CTAB, no increased WOB  CV -  RRR, no m/r/g  Abd - soft, NT, ND; no guarding or rebound  MSK - FROM of b/l UE and LE, no gross deformities  Extrem - no LE edema/erythema/tenderness  Neuro - no focal motor or sensation deficits  Skin - warm, well perfused

## 2024-02-08 NOTE — CONSULT NOTE ADULT - SUBJECTIVE AND OBJECTIVE BOX
77yo Female pt with PMH GERD and diverticulosis with recent diverticulitis within last year treated with abx (per chart review also PSVT, hepatitis C), PSHx hip surgery (2 years ago s/p mechanical fall) s/p lap cholecystectomy 1/25, discharged on 2/2 on Augmentin. Presenting to ED with complaints of over 25 episodes of liquid stools with mucus, no blood, that started last night, no nausea, no vomiting, patient admits to decreased apetite, chills, overall increased fatigue and weakness, denies fever.     In the ED patient with stable vital signs WBC 31 Jacqueline 86% Hb 12.5 HTO 37.6 Platelets 322 BUN 11 C 1.03  AST 63  Lipase 12    Last EGD 11 months ago with findings of small hiatal hernia. Last colonoscopy 2 years ago with 2 benign polyps.  Denies family hx of IBS, Crohn's, UC, or colon cancer.    PMH: divericulosis c/b diverticulitis, GERD  PSx: hip surgery 2 years after mechanical fall  Social Hx: non-smoker, doesn't drink, no illicit drug use  Family Hx: breast cancer in multiple cousins    Physical Exam:   General: NAD, resting comfortably in bed.  C/V: NSR.  Pulm: Nonlabored breathing, no respiratory distress on RA.  Abd: soft, nondistended, nontender port sites healing as expected.  Extrem: WWP, no edema, SCDs in place.  Neuro: motor/sensory grossly intact, moves all ext . to command and spontaneously.      LABS:                        12.5   31.04 )-----------( 322      ( 08 Feb 2024 11:30 )             37.6     02-08    139  |  104  |  11  ----------------------------<  124<H>  4.2   |  21<L>  |  1.03    Ca    8.6      08 Feb 2024 11:30  Phos  3.2     02-08  Mg     2.0     02-08    TPro  6.6  /  Alb  3.4  /  TBili  0.6  /  DBili  x   /  AST  63<H>  /  ALT  103<H>  /  AlkPhos  433<H>  02-08

## 2024-02-08 NOTE — PATIENT PROFILE ADULT - HISTORY OF COVID-19 VACCINATION
Patient has had good cholesterol in the past. He has lost weight and is using the elliptical machine every morning.  He is interesting getting cholesterol particle size done.  However, patient is opposed to having cholesterol medication prescribed.  Results for TIMOTHY MOORE (MRN [id number]) as of 5/19/2017 13:09   Ref. Range 6/30/2016 09:58   Cholesterol,Tot Latest Ref Range: 100-199 mg/dL 187   Triglycerides Latest Ref Range: 0-149 mg/dL 134   HDL Latest Ref Range: >=40 mg/dL 50   LDL Latest Ref Range: <100 mg/dL 110 (H)      Vaccine status unknown

## 2024-02-08 NOTE — CONSULT NOTE ADULT - ATTENDING COMMENTS
78F h/o cholelithiasis s/p laparoscopic cholecystectomy on 1/25/24 c/b cholangitis currently on cefpodoxime p/w acute onset of diarrhea x 1 day. Patient was admitted from 1/18-1/19 for abdominal pain, found to have cholelithiasis. She was discharged the next day and was admitted from 1/25-1/26 for elective cholecystectomy without complication. She was then readmitted from 1/31-2/2 for worsening RUQ pain, found to have cholangitis. She was afebrile but WBC 11, BCx grew Strep mitis oralis (2 bottles), strep salivalis (1 bottle), Rothia mucilaginosa (2 bottles) LFT downtrended with IV zosyn, and she was sent home with Augmentin. After discharge, abx was swiched to Cefpodoxime. Her abdominal pain resolved, however she started to have explosive diarrhea yesterday, 20 times/day. Denied fever/chills, n/v/abdominal pain. Thus she came to the hospital. Upon admission, afebrile, VSS, well appearing female, abdomen soft, NTND. WBC 31, Cr 1.03, C.diff toxin neg, GDH+, PCR+, GI PCR neg. Patient here with C.diff diarrhea.  Start vancomycin 125mg PO q6h. Start IV ceftriaxone 1g IV q24h for treatment of strep bacteremia.  Although this could be a contaminant given multiple organism growth, strep spp are common GI pathogens and can be real and thus cannot be ignored.  f/u repeat BCx.  If BCx ngtd >48h and diarrhea improves, then will plan for 12 days of IV abx therapy.  Obtain TTE.    Team 1 will follow you.  Case d/w primary team.    Marcelle Briceño MD, MS  Infectious Disease attending  office phone [phone]  For any questions during evening/weekend/holiday, please page ID on call

## 2024-02-08 NOTE — ED ADULT NURSE NOTE - OBJECTIVE STATEMENT
79 y/o F with pmhx cholecystectomy 2 weeks ago c/b infection (has been on abx x6 days) presents to the ED via EMS c/o weakness since yesterday and diarrhea since today. Relay her doctor told her to take Immodium this morning then come here. She called EMS for transport who placed a 20G PIV to left AC and initiated 1L NSS Bolus which is still running. Notes low fever of 99 last night. Denies pain, dizziness, vomiting, dysuria, jaundice, and any other complaints at this time. On exam, A&Ox4, NAD, on RA. BP 96/55 but normal per pt. HR 99, afebrile. Abdomen ND, NTTP. Incisions without surrounding erythema or drainage. PIV placed. Urine and stool cup given. Pt placed in iso room. Awaiting orders.

## 2024-02-08 NOTE — H&P ADULT - NSHPSOCIALHISTORY_GEN_ALL_CORE
Lives with . Independent on walking and ADLs. Denies smoking, alcohol, recreational and/or illicit drug use.

## 2024-02-08 NOTE — H&P ADULT - PROBLEM SELECTOR PLAN 4
Recent lap cholecystectomy 1/25 after MRCP showing moderate amount of sludge in distended gallbladder, with gallbladder wall thickening and hyperemia and pericholecystic fluid, consistent with acute cholecystitis; hyperenhancement of the liver parenchyma adjacent to the gallbladder fossa is suspicious for gangrenous cholecystitis.; hyperenhancement of the walls of the common hepatic duct and common bile duct, suspicious for cholangitis. Patient was discharge on 2/2 on Augmentin BID and prior to finishing the course transitioned to cefpodoxime.   PE: abdomen non TTP, no distention, no guarding and with well healed incision.  - surgery consulted, no interventions  - continue to monitor

## 2024-02-08 NOTE — H&P ADULT - NSHPPHYSICALEXAM_GEN_ALL_CORE
.  VITAL SIGNS:  T(F): 98.4 (02-08-24 @ 10:59), Max: 98.4 (02-08-24 @ 10:59)  HR: 99 (02-08-24 @ 14:40) (96 - 99)  BP: 119/67 (02-08-24 @ 14:40) (93/55 - 119/67)  BP(mean): --  RR: 17 (02-08-24 @ 14:40) (17 - 18)  SpO2: 96% (02-08-24 @ 14:40) (96% - 97%)    PHYSICAL EXAM:    Constitutional: WDWN resting comfortably in bed; NAD  HEENT: NC/AT, PERRL, EOMI, anicteric sclera, no nasal discharge; uvula midline, no oropharyngeal erythema or exudates; MMM  Neck: supple, no thyromegaly   Respiratory: CTA B/L; no W/R/R, no retractions  Cardiac: +S1/S2; RRR; no M/R/G; PMI non-displaced, no JVD  Gastrointestinal: soft, NT/ND; no rebound or guarding; +BSx4  Genitourinary: normal external genitalia  Back: spine midline, no bony tenderness or step-offs; no CVAT B/L  Extremities: WWP, no clubbing or cyanosis; no peripheral edema  Musculoskeletal: NROM x4; no joint swelling, tenderness or erythema  Vascular: 2+ radial, femoral, DP/PT pulses B/L  Dermatologic: skin warm, dry and intact; no rashes, wounds, or scars  Lymphatic: no submandibular or cervical LAD  Neurologic: AAOx3; CNII-XII grossly intact; no focal deficits  Psychiatric: affect and characteristics of appearance, verbalizations, behaviors are appropriate, denies SI/HI/AH/VH .  VITAL SIGNS:  T(F): 98.4 (02-08-24 @ 10:59), Max: 98.4 (02-08-24 @ 10:59)  HR: 99 (02-08-24 @ 14:40) (96 - 99)  BP: 119/67 (02-08-24 @ 14:40) (93/55 - 119/67)  BP(mean): --  RR: 17 (02-08-24 @ 14:40) (17 - 18)  SpO2: 96% (02-08-24 @ 14:40) (96% - 97%)    PHYSICAL EXAM:    Constitutional: WDWN resting comfortably in bed; NAD  HEENT: NC/AT, PERRL, EOMI, anicteric sclera, MMM  Neck: supple, no thyromegaly   Respiratory: CTA B/L; no W/R/R  Cardiac: +S1/S2; RRR; no M/R/G  Gastrointestinal: warm to palpation, soft, NT/ND; well healing post lap incisions, no rebound or guarding; +BSx4  Extremities: WWP, no clubbing or cyanosis; no peripheral edema  Musculoskeletal: NROM x4; no joint swelling, tenderness or erythema  Vascular: 2+ radial, femoral, DP/PT pulses B/L  Neurologic: AAOx3; CNII-XII grossly intact; no focal deficits  Psychiatric: affect and characteristics of appearance, verbalizations, behaviors are appropriate

## 2024-02-08 NOTE — H&P ADULT - PROBLEM SELECTOR PLAN 1
Patient Severe sepsis 2/2 c diff  +/- Strep bacteremia   T 98.4 -> 101.4, HR 99, WBC 31.04   s/p 2L NS Bolus    - see management of diarrhea and bacteremia  - monitor CBC w diff  - monitor

## 2024-02-08 NOTE — H&P ADULT - ASSESSMENT
78 year old female w/ recent multiple admissions for cholecystectomy and cholangitis, dc'd on PO abx, later found to have positive BCx who returns now with severe sepsis and diarrhea likely 2/2 C. Diff.

## 2024-02-08 NOTE — H&P ADULT - PROBLEM SELECTOR PLAN 6
F: s/p 2L Bolus,  ml/hr   E: Replete PRN to K>4, Mg>2  N: Regular diet   DVT PPx: Lovenox 30mg SQ  GI PPx: Protonix 40 mg  Dispo: RMF

## 2024-02-08 NOTE — H&P ADULT - ATTENDING COMMENTS
#Diarrhea, concern for severe C. diff.   #Severe sepsis, POA, w/ lactic acidosis and elevated LFTs 2/2 to likely C. diff  #?bacteremia, likely contaminant  #recent cholecystectomy  #Elevated LFts      -Diarrhea, w/ recent abx, significant WBC, w/ SIRS + and likely C. diff. STill pending full PCR for results. ID already consulted, d/w them recommending vanco PO if comes back positive. IVF. PO as tolerated.   -d/w ID, think this could be contaminant. Following repeat cultures. Will continue to monitor.   -s/p recent leon which could be causing increased LFTs or could be hypoperfusion in setting of sepsis. WOuld just monitor for now.       MDM High  Reviewed previous hospitalization records, ER course, vitals, blood work etc.   Independent review of recent CT abd as well as abd XR  case discussed with ER provider, house staff, ID attending.   Management of severe sepsis, likely severe C. diff.

## 2024-02-08 NOTE — ED ADULT NURSE NOTE - NSFALLUNIVINTERV_ED_ALL_ED
Bed/Stretcher in lowest position, wheels locked, appropriate side rails in place/Call bell, personal items and telephone in reach/Instruct patient to call for assistance before getting out of bed/chair/stretcher/Non-slip footwear applied when patient is off stretcher/[place] to call system/Physically safe environment - no spills, clutter or unnecessary equipment/Purposeful proactive rounding/Room/bathroom lighting operational, light cord in reach

## 2024-02-08 NOTE — ED PROVIDER NOTE - PROGRESS NOTE DETAILS
Yovani Betts MD: ID attending Dr. english consulted for eval. Patient to be given 1g ctx q 24 for now to cover for recently noted bactermia strep mitis

## 2024-02-08 NOTE — H&P ADULT - PROBLEM SELECTOR PLAN 3
BCx from 01/31 positive for several Strept species and R. Mucilaginosa, possible contaminant. Patient was dc'd on Augmentin and was called later on with change in medication to cefpodoxime 200mg BID.     - ID consulted, f/u recs   - Start CTX 1g qd empirically  - f/u BCx 2/8/24

## 2024-02-08 NOTE — CONSULT NOTE ADULT - ASSESSMENT
78 years old female with extensive past medical history recent lap cholecystectomy 1/25, discharged on 2/2 on Augmentin. Presenting to ED with complaints of over 25 episodes of liquid stools with mucus, no blood, patient with stable vital signs, soft abdomen, non toxic appearing, port sites healing as expected. WBC 31 Jacqueline 86% Hb 12.5 HTO 37.6 Platelets 322 BUN 11 C 1.03  AST 63  Lipase 12. Patient likely presenting with C diff colitis (stool sample sent from ED). No surgical intervention indicated at this time, will defer admission and further management to medicine and infectious disease team.     Recommendations   - No surgical intervention at this time   - Follow C diff stool sample sent 2/8  - Rest of management as per medicine team and infectious disease   - Team 1C will follow the patient if she is admitted to medicine.   - Plan discussed with chief resident on call and attending on call Dr Magallon

## 2024-02-08 NOTE — H&P ADULT - PROBLEM SELECTOR PLAN 2
#r/o c diff   Patient with more than 25 episodes of diarrhea since the night prior to admission. Denies bloody bowel movement. Denies abdominal pain. Reports fevers and chills.   C diff toxins indeterminant    - f/u GI PCR   - f/u c diff PCR  - if Positive start vancomycin 125 mg PO q6hrs for 10-14 days  - f/u ID recs

## 2024-02-08 NOTE — ED PROVIDER NOTE - OBJECTIVE STATEMENT
78 year old female with history of GERD, diverticulitis, PSVT, hepatitis C, recent multiple admissions for cholecystectomy, cholangitis, dc'd on PO abx, returning for diarrhea and generalized weakness. States having onset of severe diarrhea yesterday, liquid/mucous-like, denies associated fever, chills, chest pain, abdominal pain, n/v, but feels very weak in general. Does not recall name of abx. Follows with Dr. Magallon for care.
04-Jan-2017 16:30

## 2024-02-08 NOTE — H&P ADULT - HISTORY OF PRESENT ILLNESS
ED Course:  VS: T 98.4 -> 101.4 HR 99  BP 93/55  RR 18  SaO2 97%RA  LABS: WBC 31.04   EKG:  IMAGING:  MANAGEMENT:  CONSULTS: None  HPI: 8 year old female with history of GERD, diverticulitis, PSVT, hepatitis C, recent multiple admissions for cholecystectomy, cholangitis, dc'd on PO abx, returning for diarrhea and generalized weakness. Patient was found to have positive BCx w/ Strept mitis/oralis species, Strept salivarius, and Rothia mucilaginosa on prior admission. Patient states someone called her to change her PO regimen likely due to this finding. Now patient is presenting to ED with complaints of over 25 episodes of liquid stools with mucus, no blood, that started last night, no nausea, no vomiting, patient admits to decreased apetite, chills, overall increased fatigue and weakness, denies fever.     ED Course:  VS: T 98.4 -> 101.4 HR 99  BP 93/55  RR 18  SaO2 97%RA  LABS: WBC 31.04 Jacqueline 86% Hb 12.5 HTO 37.6 Platelets 322 BUN 11 C 1.03  AST 63  Lipase 12  IMAGING: none   MANAGEMENT: CTX 1 gram, 2L LR Bolus, Ofirmev   CONSULTS: Surgery and ID  HPI: 78 year old female with history of GERD, diverticulitis, PSVT, hepatitis C, recent multiple admissions for cholecystectomy, cholangitis, dc'd on PO abx, returning for diarrhea and generalized weakness. Patient was found to have positive BCx w/ Strept mitis/oralis species, Strept salivarius, and Rothia mucilaginosa on prior admission. Patient states someone called her to change her PO regimen likely due to this finding. Now patient is presenting to ED with complaints of over 25 episodes of liquid stools with mucus, no blood, that started last night, no nausea, no vomiting, patient admits to decreased appetite chills, overall increased fatigue and weakness, denies fever.     ED Course:  VS: T 98.4 -> 101.4 HR 99  BP 93/55  RR 18  SaO2 97%RA  LABS: WBC 31.04 Jacqueline 86% Hb 12.5 HTO 37.6 Platelets 322 BUN 11 C 1.03  AST 63  Lipase 12  IMAGING: none   MANAGEMENT: CTX 1 gram, 2L LR Bolus, Ofirmev   CONSULTS: Surgery and ID

## 2024-02-08 NOTE — H&P ADULT - NSHPLABSRESULTS_GEN_ALL_CORE
LABS:                         12.5   31.04 )-----------( 322      ( 08 Feb 2024 11:30 )             37.6     02-08    139  |  104  |  11  ----------------------------<  124<H>  4.2   |  21<L>  |  1.03    Ca    8.6      08 Feb 2024 11:30  Phos  3.2     02-08  Mg     2.0     02-08    TPro  6.6  /  Alb  3.4  /  TBili  0.6  /  DBili  x   /  AST  63<H>  /  ALT  103<H>  /  AlkPhos  433<H>  02-08      Urinalysis Basic - ( 08 Feb 2024 11:30 )    Color: x / Appearance: x / SG: x / pH: x  Gluc: 124 mg/dL / Ketone: x  / Bili: x / Urobili: x   Blood: x / Protein: x / Nitrite: x   Leuk Esterase: x / RBC: x / WBC x   Sq Epi: x / Non Sq Epi: x / Bacteria: x            Lactate, Blood: 4.2 mmol/L (02-08 @ 15:25)      RADIOLOGY, EKG & ADDITIONAL TESTS: Reviewed.

## 2024-02-08 NOTE — ED ADULT NURSE REASSESSMENT NOTE - NS ED NURSE REASSESS COMMENT FT1
PO vanco given. Fluids not finished. Rpt labs to be complete after fluids. Transport here, pt to go upstairs.

## 2024-02-08 NOTE — CONSULT NOTE ADULT - SUBJECTIVE AND OBJECTIVE BOX
INFECTIOUS DISEASES INITIAL CONSULT NOTE    HPI:      PAST MEDICAL & SURGICAL HISTORY:  Acute diverticulitis      GERD (gastroesophageal reflux disease)      Hiatal hernia      No significant past surgical history            Review of Systems:   Constitutional, eyes, ENT, cardiovascular, respiratory, gastrointestinal, genitourinary, integumentary, neurological, psychiatric and heme/lymph are otherwise negative other than noted above       ANTIBIOTICS:  MEDICATIONS  (STANDING):    MEDICATIONS  (PRN):      Allergies    fish (Rash)  No Known Drug Allergies    Intolerances        SOCIAL HISTORY:    FAMILY HISTORY:   no FH leading to current infection    Vital Signs Last 24 Hrs  T(C): 36.9 (08 Feb 2024 10:59), Max: 36.9 (08 Feb 2024 10:59)  T(F): 98.4 (08 Feb 2024 10:59), Max: 98.4 (08 Feb 2024 10:59)  HR: 99 (08 Feb 2024 14:40) (96 - 99)  BP: 119/67 (08 Feb 2024 14:40) (93/55 - 119/67)  BP(mean): --  RR: 17 (08 Feb 2024 14:40) (17 - 18)  SpO2: 96% (08 Feb 2024 14:40) (96% - 97%)    Parameters below as of 08 Feb 2024 14:40  Patient On (Oxygen Delivery Method): room air          PHYSICAL EXAM:  Constitutional: alert, NAD  Eyes: the sclera and conjunctiva were normal.   ENT: the ears and nose were normal in appearance.   Neck: the appearance of the neck was normal and the neck was supple.   Pulmonary: no respiratory distress and lungs were clear to auscultation bilaterally.   Heart: heart rate was normal and rhythm regular, normal S1 and S2  Vascular:. there was no peripheral edema  Abdomen: normal bowel sounds, soft, non-tender  Neurological: no focal deficits.   Psychiatric: the affect was normal      LABS:                        12.5   31.04 )-----------( 322      ( 08 Feb 2024 11:30 )             37.6     02-08    139  |  104  |  11  ----------------------------<  124<H>  4.2   |  21<L>  |  1.03    Ca    8.6      08 Feb 2024 11:30  Phos  3.2     02-08  Mg     2.0     02-08    TPro  6.6  /  Alb  3.4  /  TBili  0.6  /  DBili  x   /  AST  63<H>  /  ALT  103<H>  /  AlkPhos  433<H>  02-08      Urinalysis Basic - ( 08 Feb 2024 11:30 )    Color: x / Appearance: x / SG: x / pH: x  Gluc: 124 mg/dL / Ketone: x  / Bili: x / Urobili: x   Blood: x / Protein: x / Nitrite: x   Leuk Esterase: x / RBC: x / WBC x   Sq Epi: x / Non Sq Epi: x / Bacteria: x        MICROBIOLOGY:    RADIOLOGY & ADDITIONAL STUDIES:   INFECTIOUS DISEASES INITIAL CONSULT NOTE    HPI: 8 year old female with history of GERD, diverticulitis, PSVT, hepatitis C, recent multiple admissions for cholecystectomy, cholangitis, dc'd on PO abx, returning for diarrhea and generalized weakness. Patient was found to have positive BCx w/ Strept mitis/oralis species, Strept salivarius, and Rothia mucilaginosa on prior admission. Patient states someone called her to change her PO regimen likely due to this finding. Now patient is presenting to ED with complaints of over 25 episodes of liquid stools with mucus, no blood, that started last night, no nausea, no vomiting, patient admits to decreased apetite, chills, overall increased fatigue and weakness, denies fever.     In the ED patient with stable vital signs WBC 31 Jacqueline 86% Hb 12.5 HTO 37.6 Platelets 322 BUN 11 C 1.03  AST 63  Lipase 12      PAST MEDICAL & SURGICAL HISTORY:  Acute diverticulitis      GERD (gastroesophageal reflux disease)      Hiatal hernia      No significant past surgical history            Review of Systems:   Constitutional, eyes, ENT, cardiovascular, respiratory, gastrointestinal, genitourinary, integumentary, neurological, psychiatric and heme/lymph are otherwise negative other than noted above       ANTIBIOTICS:  MEDICATIONS  (STANDING):    MEDICATIONS  (PRN):      Allergies    fish (Rash)  No Known Drug Allergies    Intolerances        SOCIAL HISTORY:    FAMILY HISTORY:   no FH leading to current infection    Vital Signs Last 24 Hrs  T(C): 36.9 (08 Feb 2024 10:59), Max: 36.9 (08 Feb 2024 10:59)  T(F): 98.4 (08 Feb 2024 10:59), Max: 98.4 (08 Feb 2024 10:59)  HR: 99 (08 Feb 2024 14:40) (96 - 99)  BP: 119/67 (08 Feb 2024 14:40) (93/55 - 119/67)  BP(mean): --  RR: 17 (08 Feb 2024 14:40) (17 - 18)  SpO2: 96% (08 Feb 2024 14:40) (96% - 97%)    Parameters below as of 08 Feb 2024 14:40  Patient On (Oxygen Delivery Method): room air          PHYSICAL EXAM:  Constitutional: alert, NAD  Eyes: the sclera and conjunctiva were normal.   ENT: the ears and nose were normal in appearance.   Neck: the appearance of the neck was normal and the neck was supple.   Pulmonary: no respiratory distress and lungs were clear to auscultation bilaterally.   Heart: heart rate was normal and rhythm regular, normal S1 and S2  Vascular:. there was no peripheral edema  Abdomen: normal bowel sounds, soft, non-tender  Neurological: no focal deficits.   Psychiatric: the affect was normal      LABS:                        12.5   31.04 )-----------( 322      ( 08 Feb 2024 11:30 )             37.6     02-08    139  |  104  |  11  ----------------------------<  124<H>  4.2   |  21<L>  |  1.03    Ca    8.6      08 Feb 2024 11:30  Phos  3.2     02-08  Mg     2.0     02-08    TPro  6.6  /  Alb  3.4  /  TBili  0.6  /  DBili  x   /  AST  63<H>  /  ALT  103<H>  /  AlkPhos  433<H>  02-08      Urinalysis Basic - ( 08 Feb 2024 11:30 )    Color: x / Appearance: x / SG: x / pH: x  Gluc: 124 mg/dL / Ketone: x  / Bili: x / Urobili: x   Blood: x / Protein: x / Nitrite: x   Leuk Esterase: x / RBC: x / WBC x   Sq Epi: x / Non Sq Epi: x / Bacteria: x        MICROBIOLOGY:    RADIOLOGY & ADDITIONAL STUDIES:

## 2024-02-09 LAB
ALBUMIN SERPL ELPH-MCNC: 2.9 G/DL — LOW (ref 3.3–5)
ALP SERPL-CCNC: 317 U/L — HIGH (ref 40–120)
ALT FLD-CCNC: 60 U/L — HIGH (ref 10–45)
ANION GAP SERPL CALC-SCNC: 9 MMOL/L — SIGNIFICANT CHANGE UP (ref 5–17)
AST SERPL-CCNC: 25 U/L — SIGNIFICANT CHANGE UP (ref 10–40)
BASOPHILS # BLD AUTO: 0.07 K/UL — SIGNIFICANT CHANGE UP (ref 0–0.2)
BASOPHILS NFR BLD AUTO: 0.3 % — SIGNIFICANT CHANGE UP (ref 0–2)
BILIRUB SERPL-MCNC: 0.5 MG/DL — SIGNIFICANT CHANGE UP (ref 0.2–1.2)
BUN SERPL-MCNC: 9 MG/DL — SIGNIFICANT CHANGE UP (ref 7–23)
CALCIUM SERPL-MCNC: 8.3 MG/DL — LOW (ref 8.4–10.5)
CHLORIDE SERPL-SCNC: 107 MMOL/L — SIGNIFICANT CHANGE UP (ref 96–108)
CO2 SERPL-SCNC: 22 MMOL/L — SIGNIFICANT CHANGE UP (ref 22–31)
CREAT SERPL-MCNC: 0.72 MG/DL — SIGNIFICANT CHANGE UP (ref 0.5–1.3)
EGFR: 87 ML/MIN/1.73M2 — SIGNIFICANT CHANGE UP
EOSINOPHIL # BLD AUTO: 0.09 K/UL — SIGNIFICANT CHANGE UP (ref 0–0.5)
EOSINOPHIL NFR BLD AUTO: 0.3 % — SIGNIFICANT CHANGE UP (ref 0–6)
GLUCOSE SERPL-MCNC: 109 MG/DL — HIGH (ref 70–99)
HCT VFR BLD CALC: 32.5 % — LOW (ref 34.5–45)
HGB BLD-MCNC: 10.9 G/DL — LOW (ref 11.5–15.5)
IMM GRANULOCYTES NFR BLD AUTO: 0.9 % — SIGNIFICANT CHANGE UP (ref 0–0.9)
LYMPHOCYTES # BLD AUTO: 1.26 K/UL — SIGNIFICANT CHANGE UP (ref 1–3.3)
LYMPHOCYTES # BLD AUTO: 4.9 % — LOW (ref 13–44)
MAGNESIUM SERPL-MCNC: 1.6 MG/DL — SIGNIFICANT CHANGE UP (ref 1.6–2.6)
MAGNESIUM SERPL-MCNC: 1.7 MG/DL — SIGNIFICANT CHANGE UP (ref 1.6–2.6)
MCHC RBC-ENTMCNC: 30.9 PG — SIGNIFICANT CHANGE UP (ref 27–34)
MCHC RBC-ENTMCNC: 33.5 GM/DL — SIGNIFICANT CHANGE UP (ref 32–36)
MCV RBC AUTO: 92.1 FL — SIGNIFICANT CHANGE UP (ref 80–100)
MONOCYTES # BLD AUTO: 1.95 K/UL — HIGH (ref 0–0.9)
MONOCYTES NFR BLD AUTO: 7.6 % — SIGNIFICANT CHANGE UP (ref 2–14)
NEUTROPHILS # BLD AUTO: 22.2 K/UL — HIGH (ref 1.8–7.4)
NEUTROPHILS NFR BLD AUTO: 86 % — HIGH (ref 43–77)
NRBC # BLD: 0 /100 WBCS — SIGNIFICANT CHANGE UP (ref 0–0)
PHOSPHATE SERPL-MCNC: 2.6 MG/DL — SIGNIFICANT CHANGE UP (ref 2.5–4.5)
PHOSPHATE SERPL-MCNC: 2.8 MG/DL — SIGNIFICANT CHANGE UP (ref 2.5–4.5)
PLATELET # BLD AUTO: 243 K/UL — SIGNIFICANT CHANGE UP (ref 150–400)
POTASSIUM SERPL-MCNC: 3.8 MMOL/L — SIGNIFICANT CHANGE UP (ref 3.5–5.3)
POTASSIUM SERPL-SCNC: 3.8 MMOL/L — SIGNIFICANT CHANGE UP (ref 3.5–5.3)
PROT SERPL-MCNC: 5.5 G/DL — LOW (ref 6–8.3)
RBC # BLD: 3.53 M/UL — LOW (ref 3.8–5.2)
RBC # FLD: 13.7 % — SIGNIFICANT CHANGE UP (ref 10.3–14.5)
SODIUM SERPL-SCNC: 138 MMOL/L — SIGNIFICANT CHANGE UP (ref 135–145)
WBC # BLD: 25.8 K/UL — HIGH (ref 3.8–10.5)
WBC # FLD AUTO: 25.8 K/UL — HIGH (ref 3.8–10.5)

## 2024-02-09 PROCEDURE — 93010 ELECTROCARDIOGRAM REPORT: CPT

## 2024-02-09 PROCEDURE — 99232 SBSQ HOSP IP/OBS MODERATE 35: CPT | Mod: GC

## 2024-02-09 PROCEDURE — 99232 SBSQ HOSP IP/OBS MODERATE 35: CPT

## 2024-02-09 RX ORDER — SODIUM CHLORIDE 9 MG/ML
1000 INJECTION, SOLUTION INTRAVENOUS
Refills: 0 | Status: DISCONTINUED | OUTPATIENT
Start: 2024-02-09 | End: 2024-02-09

## 2024-02-09 RX ORDER — POTASSIUM CHLORIDE 20 MEQ
40 PACKET (EA) ORAL ONCE
Refills: 0 | Status: COMPLETED | OUTPATIENT
Start: 2024-02-09 | End: 2024-02-09

## 2024-02-09 RX ORDER — SODIUM CHLORIDE 9 MG/ML
1000 INJECTION, SOLUTION INTRAVENOUS
Refills: 0 | Status: DISCONTINUED | OUTPATIENT
Start: 2024-02-09 | End: 2024-02-12

## 2024-02-09 RX ORDER — MAGNESIUM SULFATE 500 MG/ML
2 VIAL (ML) INJECTION ONCE
Refills: 0 | Status: COMPLETED | OUTPATIENT
Start: 2024-02-09 | End: 2024-02-09

## 2024-02-09 RX ADMIN — PANTOPRAZOLE SODIUM 40 MILLIGRAM(S): 20 TABLET, DELAYED RELEASE ORAL at 06:42

## 2024-02-09 RX ADMIN — CEFTRIAXONE 100 MILLIGRAM(S): 500 INJECTION, POWDER, FOR SOLUTION INTRAMUSCULAR; INTRAVENOUS at 14:37

## 2024-02-09 RX ADMIN — Medication 1 TABLET(S): at 11:49

## 2024-02-09 RX ADMIN — SODIUM CHLORIDE 100 MILLILITER(S): 9 INJECTION, SOLUTION INTRAVENOUS at 04:46

## 2024-02-09 RX ADMIN — Medication 125 MILLIGRAM(S): at 06:42

## 2024-02-09 RX ADMIN — Medication 125 MILLIGRAM(S): at 11:49

## 2024-02-09 RX ADMIN — Medication 650 MILLIGRAM(S): at 08:12

## 2024-02-09 RX ADMIN — SODIUM CHLORIDE 150 MILLILITER(S): 9 INJECTION, SOLUTION INTRAVENOUS at 14:37

## 2024-02-09 RX ADMIN — Medication 650 MILLIGRAM(S): at 00:44

## 2024-02-09 RX ADMIN — ENOXAPARIN SODIUM 30 MILLIGRAM(S): 100 INJECTION SUBCUTANEOUS at 21:39

## 2024-02-09 RX ADMIN — Medication 40 MILLIEQUIVALENT(S): at 09:42

## 2024-02-09 RX ADMIN — Medication 25 GRAM(S): at 09:42

## 2024-02-09 RX ADMIN — Medication 650 MILLIGRAM(S): at 07:12

## 2024-02-09 RX ADMIN — SODIUM CHLORIDE 150 MILLILITER(S): 9 INJECTION, SOLUTION INTRAVENOUS at 07:13

## 2024-02-09 RX ADMIN — Medication 125 MILLIGRAM(S): at 17:16

## 2024-02-09 RX ADMIN — SODIUM CHLORIDE 150 MILLILITER(S): 9 INJECTION, SOLUTION INTRAVENOUS at 21:39

## 2024-02-09 RX ADMIN — Medication 40 MILLIEQUIVALENT(S): at 00:47

## 2024-02-09 NOTE — DIETITIAN INITIAL EVALUATION ADULT - PROBLEM SELECTOR PLAN 1
Severe sepsis 2/2 c diff  +/- Strep bacteremia   T 98.4 -> 101.4, HR 99, WBC 31.04   s/p 2L NS Bolus    - see management of diarrhea and bacteremia  - monitor CBC w diff  - monitor

## 2024-02-09 NOTE — DIETITIAN INITIAL EVALUATION ADULT - OTHER CALCULATIONS
Based on Standards of Care pt within % Ideal body weight (125 pounds, 88% Ideal Body Weight), thus Actual Body Weight used for all calculations. Needs adjusted for advanced age and mild malnutrition.  Based on Standards of Care pt within % Ideal body weight (125 pounds, 88% Ideal Body Weight), thus Actual Body Weight used for all calculations (110#). Needs adjusted for advanced age and malnutrition.

## 2024-02-09 NOTE — PROGRESS NOTE ADULT - ASSESSMENT
77 yo woman with pmhx of GERD, diverticulitis, PSVT, HEP C treated over 20 years ago, and recent multiple admissions for cholecystectomy, cholangitis, returning for severe diarrhea and weakness in the setting of antibiotic use and positive BCx on 1/31 growing multiple bacteria including strep species found to in severe sepsis and have a GI PCR positive for C. diff.

## 2024-02-09 NOTE — PROGRESS NOTE ADULT - SUBJECTIVE AND OBJECTIVE BOX
INFECTIOUS DISEASES CONSULT FOLLOW-UP NOTE    INTERVAL HPI/OVERNIGHT EVENTS:      ROS:   Constitutional, eyes, ENT, cardiovascular, respiratory, gastrointestinal, genitourinary, integumentary, neurological, psychiatric and heme/lymph are otherwise negative other than noted above       ANTIBIOTICS/RELEVANT:    MEDICATIONS  (STANDING):  cefTRIAXone   IVPB 1000 milliGRAM(s) IV Intermittent every 24 hours  enoxaparin Injectable 30 milliGRAM(s) SubCutaneous every 24 hours  lactated ringers. 1000 milliLiter(s) (150 mL/Hr) IV Continuous <Continuous>  multivitamin/minerals 1 Tablet(s) Oral daily  pantoprazole    Tablet 40 milliGRAM(s) Oral before breakfast  vancomycin    Solution 125 milliGRAM(s) Oral every 6 hours    MEDICATIONS  (PRN):  acetaminophen     Tablet .. 650 milliGRAM(s) Oral every 6 hours PRN Temp greater or equal to 38C (100.4F), Mild Pain (1 - 3)  aluminum hydroxide/magnesium hydroxide/simethicone Suspension 30 milliLiter(s) Oral every 4 hours PRN Dyspepsia  melatonin 3 milliGRAM(s) Oral at bedtime PRN Insomnia  ondansetron Injectable 4 milliGRAM(s) IV Push every 8 hours PRN Nausea and/or Vomiting        Vital Signs Last 24 Hrs  T(C): 38.1 (09 Feb 2024 06:03), Max: 38.6 (08 Feb 2024 17:05)  T(F): 100.5 (09 Feb 2024 06:03), Max: 101.4 (08 Feb 2024 17:05)  HR: 105 (09 Feb 2024 07:12) (95 - 121)  BP: 114/68 (09 Feb 2024 06:03) (93/55 - 148/75)  BP(mean): --  RR: 18 (09 Feb 2024 06:03) (17 - 18)  SpO2: 94% (09 Feb 2024 06:03) (94% - 99%)    Parameters below as of 09 Feb 2024 06:03  Patient On (Oxygen Delivery Method): room air        02-09-24 @ 07:01  -  02-09-24 @ 10:27  --------------------------------------------------------  IN: 300 mL / OUT: 0 mL / NET: 300 mL      PHYSICAL EXAM:  Constitutional: alert, NAD  Eyes: the sclera and conjunctiva were normal.   ENT: the ears and nose were normal in appearance.   Neck: the appearance of the neck was normal and the neck was supple.   Pulmonary: no respiratory distress and lungs were clear to auscultation bilaterally.   Heart: heart rate was normal and rhythm regular, normal S1 and S2  Vascular:. there was no peripheral edema  Abdomen: normal bowel sounds, soft, non-tender  Neurological: no focal deficits.   Psychiatric: the affect was normal        LABS:                        10.9   25.80 )-----------( 243      ( 09 Feb 2024 05:30 )             32.5     02-09    138  |  107  |  9   ----------------------------<  109<H>  3.8   |  22  |  0.72    Ca    8.3<L>      09 Feb 2024 05:30  Phos  2.6     02-09  Mg     1.6     02-09    TPro  5.5<L>  /  Alb  2.9<L>  /  TBili  0.5  /  DBili  x   /  AST  25  /  ALT  60<H>  /  AlkPhos  317<H>  02-09      Urinalysis Basic - ( 09 Feb 2024 05:30 )    Color: x / Appearance: x / SG: x / pH: x  Gluc: 109 mg/dL / Ketone: x  / Bili: x / Urobili: x   Blood: x / Protein: x / Nitrite: x   Leuk Esterase: x / RBC: x / WBC x   Sq Epi: x / Non Sq Epi: x / Bacteria: x        MICROBIOLOGY:      RADIOLOGY & ADDITIONAL STUDIES:  Reviewed INFECTIOUS DISEASES CONSULT FOLLOW-UP NOTE    INTERVAL HPI/OVERNIGHT EVENTS: 9 episodes of diarrhea since last night. Has not been tolerating food because each time she eats she needs to go to the bathroom.      ROS:   Constitutional, eyes, ENT, cardiovascular, respiratory, gastrointestinal, genitourinary, integumentary, neurological, psychiatric and heme/lymph are otherwise negative other than noted above       ANTIBIOTICS/RELEVANT:    MEDICATIONS  (STANDING):  cefTRIAXone   IVPB 1000 milliGRAM(s) IV Intermittent every 24 hours  enoxaparin Injectable 30 milliGRAM(s) SubCutaneous every 24 hours  lactated ringers. 1000 milliLiter(s) (150 mL/Hr) IV Continuous <Continuous>  multivitamin/minerals 1 Tablet(s) Oral daily  pantoprazole    Tablet 40 milliGRAM(s) Oral before breakfast  vancomycin    Solution 125 milliGRAM(s) Oral every 6 hours    MEDICATIONS  (PRN):  acetaminophen     Tablet .. 650 milliGRAM(s) Oral every 6 hours PRN Temp greater or equal to 38C (100.4F), Mild Pain (1 - 3)  aluminum hydroxide/magnesium hydroxide/simethicone Suspension 30 milliLiter(s) Oral every 4 hours PRN Dyspepsia  melatonin 3 milliGRAM(s) Oral at bedtime PRN Insomnia  ondansetron Injectable 4 milliGRAM(s) IV Push every 8 hours PRN Nausea and/or Vomiting        Vital Signs Last 24 Hrs  T(C): 38.1 (09 Feb 2024 06:03), Max: 38.6 (08 Feb 2024 17:05)  T(F): 100.5 (09 Feb 2024 06:03), Max: 101.4 (08 Feb 2024 17:05)  HR: 105 (09 Feb 2024 07:12) (95 - 121)  BP: 114/68 (09 Feb 2024 06:03) (93/55 - 148/75)  BP(mean): --  RR: 18 (09 Feb 2024 06:03) (17 - 18)  SpO2: 94% (09 Feb 2024 06:03) (94% - 99%)    Parameters below as of 09 Feb 2024 06:03  Patient On (Oxygen Delivery Method): room air        02-09-24 @ 07:01  -  02-09-24 @ 10:27  --------------------------------------------------------  IN: 300 mL / OUT: 0 mL / NET: 300 mL      PHYSICAL EXAM:  Constitutional: alert, NAD  Eyes: the sclera and conjunctiva were normal.   ENT: the ears and nose were normal in appearance.   Neck: the appearance of the neck was normal and the neck was supple.   Pulmonary: no respiratory distress and lungs were clear to auscultation bilaterally.   Heart: heart rate was normal and rhythm regular, normal S1 and S2  Vascular:. there was no peripheral edema  Abdomen: soft, mild R sided abd tenderness  Neurological: no focal deficits.   Psychiatric: the affect was normal        LABS:                        10.9   25.80 )-----------( 243      ( 09 Feb 2024 05:30 )             32.5     02-09    138  |  107  |  9   ----------------------------<  109<H>  3.8   |  22  |  0.72    Ca    8.3<L>      09 Feb 2024 05:30  Phos  2.6     02-09  Mg     1.6     02-09    TPro  5.5<L>  /  Alb  2.9<L>  /  TBili  0.5  /  DBili  x   /  AST  25  /  ALT  60<H>  /  AlkPhos  317<H>  02-09      Urinalysis Basic - ( 09 Feb 2024 05:30 )    Color: x / Appearance: x / SG: x / pH: x  Gluc: 109 mg/dL / Ketone: x  / Bili: x / Urobili: x   Blood: x / Protein: x / Nitrite: x   Leuk Esterase: x / RBC: x / WBC x   Sq Epi: x / Non Sq Epi: x / Bacteria: x        MICROBIOLOGY:      RADIOLOGY & ADDITIONAL STUDIES:  Reviewed

## 2024-02-09 NOTE — PROGRESS NOTE ADULT - PROBLEM SELECTOR PLAN 1
Severe sepsis 2/2 c diff  +/- Strep bacteremia   T 98.4 -> 101.4, HR 99, WBC 31.04   s/p 2L NS Bolus    - CTX 1g QD for empiric coverage per ID recs  - f/u 2/8 BCx  - monitor CBC w diff  - monitor  - ID following

## 2024-02-09 NOTE — PROGRESS NOTE ADULT - SUBJECTIVE AND OBJECTIVE BOX
OVERNIGHT EVENTS:    SUBJECTIVE / INTERVAL HPI: Patient seen and examined at bedside.     VITAL SIGNS:  Vital Signs Last 24 Hrs  T(C): 38.1 (09 Feb 2024 06:03), Max: 38.6 (08 Feb 2024 17:05)  T(F): 100.5 (09 Feb 2024 06:03), Max: 101.4 (08 Feb 2024 17:05)  HR: 105 (09 Feb 2024 07:12) (95 - 121)  BP: 114/68 (09 Feb 2024 06:03) (93/55 - 148/75)  BP(mean): --  RR: 18 (09 Feb 2024 06:03) (17 - 18)  SpO2: 94% (09 Feb 2024 06:03) (94% - 99%)    Parameters below as of 09 Feb 2024 06:03  Patient On (Oxygen Delivery Method): room air        PHYSICAL EXAM:    General: alert, in no acute distress  HEENT: NC/AT; PERRL, anicteric sclera; MMM  Neck: supple  Cardiovascular: +S1/S2, RRR  Respiratory: CTA B/L; no W/R/R  Gastrointestinal: soft, NT/ND; +BSx4  Extremities: WWP; no edema, clubbing or cyanosis  Vascular: 2+ radial, DP/PT pulses B/L  Neurological: AAOx3; no focal deficits    MEDICATIONS:  MEDICATIONS  (STANDING):  cefTRIAXone   IVPB 1000 milliGRAM(s) IV Intermittent every 24 hours  enoxaparin Injectable 30 milliGRAM(s) SubCutaneous every 24 hours  lactated ringers. 1000 milliLiter(s) (150 mL/Hr) IV Continuous <Continuous>  multivitamin/minerals 1 Tablet(s) Oral daily  pantoprazole    Tablet 40 milliGRAM(s) Oral before breakfast  vancomycin    Solution 125 milliGRAM(s) Oral every 6 hours    MEDICATIONS  (PRN):  acetaminophen     Tablet .. 650 milliGRAM(s) Oral every 6 hours PRN Temp greater or equal to 38C (100.4F), Mild Pain (1 - 3)  aluminum hydroxide/magnesium hydroxide/simethicone Suspension 30 milliLiter(s) Oral every 4 hours PRN Dyspepsia  melatonin 3 milliGRAM(s) Oral at bedtime PRN Insomnia  ondansetron Injectable 4 milliGRAM(s) IV Push every 8 hours PRN Nausea and/or Vomiting      ALLERGIES:  Allergies    fish (Rash)  No Known Drug Allergies    Intolerances        LABS:                        12.5   31.04 )-----------( 322      ( 08 Feb 2024 11:30 )             37.6     02-08    135  |  103  |  11  ----------------------------<  125<H>  3.6   |  21<L>  |  0.79    Ca    8.4      08 Feb 2024 22:22  Phos  2.8     02-08  Mg     1.7     02-08    TPro  5.7<L>  /  Alb  3.0<L>  /  TBili  0.4  /  DBili  x   /  AST  36  /  ALT  77<H>  /  AlkPhos  329<H>  02-08      Urinalysis Basic - ( 08 Feb 2024 22:22 )    Color: x / Appearance: x / SG: x / pH: x  Gluc: 125 mg/dL / Ketone: x  / Bili: x / Urobili: x   Blood: x / Protein: x / Nitrite: x   Leuk Esterase: x / RBC: x / WBC x   Sq Epi: x / Non Sq Epi: x / Bacteria: x      CAPILLARY BLOOD GLUCOSE          RADIOLOGY & ADDITIONAL TESTS: Reviewed. OVERNIGHT EVENTS: 3 episodes of watery brown stool. Febrile to 100.5 and tachycardic to 121 overnight, given Tylenol for fever 100.5 > 99.2. Some chills reported.    SUBJECTIVE / INTERVAL HPI: Patient seen and examined at bedside. No acute distress. Reports about 5 episodes of diarrhea this morning and states she has diarrhea every time she tries to drink water or eat anything.    VITAL SIGNS:  Vital Signs Last 24 Hrs  T(C): 38.1 (09 Feb 2024 06:03), Max: 38.6 (08 Feb 2024 17:05)  T(F): 100.5 (09 Feb 2024 06:03), Max: 101.4 (08 Feb 2024 17:05)  HR: 105 (09 Feb 2024 07:12) (95 - 121)  BP: 114/68 (09 Feb 2024 06:03) (93/55 - 148/75)  BP(mean): --  RR: 18 (09 Feb 2024 06:03) (17 - 18)  SpO2: 94% (09 Feb 2024 06:03) (94% - 99%)    Parameters below as of 09 Feb 2024 06:03  Patient On (Oxygen Delivery Method): room air        PHYSICAL EXAM:    General: alert, in no acute distress  HEENT: NC/AT; PERRL, anicteric sclera; MMM  Neck: supple  Cardiovascular: +S1/S2, RRR  Respiratory: CTA B/L; no W/R/R  Gastrointestinal: Mild tenderness over RUQ to deep palpation, soft, ND; +BSx4  Extremities: WWP; no edema, clubbing or cyanosis  Vascular: 2+ radial, DP/PT pulses B/L  Neurological: AAOx3; no focal deficits    MEDICATIONS:  MEDICATIONS  (STANDING):  cefTRIAXone   IVPB 1000 milliGRAM(s) IV Intermittent every 24 hours  enoxaparin Injectable 30 milliGRAM(s) SubCutaneous every 24 hours  lactated ringers. 1000 milliLiter(s) (150 mL/Hr) IV Continuous <Continuous>  multivitamin/minerals 1 Tablet(s) Oral daily  pantoprazole    Tablet 40 milliGRAM(s) Oral before breakfast  vancomycin    Solution 125 milliGRAM(s) Oral every 6 hours    MEDICATIONS  (PRN):  acetaminophen     Tablet .. 650 milliGRAM(s) Oral every 6 hours PRN Temp greater or equal to 38C (100.4F), Mild Pain (1 - 3)  aluminum hydroxide/magnesium hydroxide/simethicone Suspension 30 milliLiter(s) Oral every 4 hours PRN Dyspepsia  melatonin 3 milliGRAM(s) Oral at bedtime PRN Insomnia  ondansetron Injectable 4 milliGRAM(s) IV Push every 8 hours PRN Nausea and/or Vomiting      ALLERGIES:  Allergies    fish (Rash)  No Known Drug Allergies    Intolerances        LABS:                        12.5   31.04 )-----------( 322      ( 08 Feb 2024 11:30 )             37.6     02-08    135  |  103  |  11  ----------------------------<  125<H>  3.6   |  21<L>  |  0.79    Ca    8.4      08 Feb 2024 22:22  Phos  2.8     02-08  Mg     1.7     02-08    TPro  5.7<L>  /  Alb  3.0<L>  /  TBili  0.4  /  DBili  x   /  AST  36  /  ALT  77<H>  /  AlkPhos  329<H>  02-08      Urinalysis Basic - ( 08 Feb 2024 22:22 )    Color: x / Appearance: x / SG: x / pH: x  Gluc: 125 mg/dL / Ketone: x  / Bili: x / Urobili: x   Blood: x / Protein: x / Nitrite: x   Leuk Esterase: x / RBC: x / WBC x   Sq Epi: x / Non Sq Epi: x / Bacteria: x      CAPILLARY BLOOD GLUCOSE          RADIOLOGY & ADDITIONAL TESTS: Reviewed. OVERNIGHT EVENTS: 3 episodes of watery brown stool. Febrile to 100.5 and tachycardic to 121 overnight, given Tylenol for fever 100.5 > 99.2. Some chills reported.    SUBJECTIVE / INTERVAL HPI: Patient seen and examined at bedside. No acute distress. Reports about 5 episodes of diarrhea this morning and states she has diarrhea every time she tries to drink water or eat anything. Ambulating without issues. Some abdominal cramping. No HA, dizziness, chest pain, SOB, n/v.    VITAL SIGNS:  Vital Signs Last 24 Hrs  T(C): 38.1 (09 Feb 2024 06:03), Max: 38.6 (08 Feb 2024 17:05)  T(F): 100.5 (09 Feb 2024 06:03), Max: 101.4 (08 Feb 2024 17:05)  HR: 105 (09 Feb 2024 07:12) (95 - 121)  BP: 114/68 (09 Feb 2024 06:03) (93/55 - 148/75)  BP(mean): --  RR: 18 (09 Feb 2024 06:03) (17 - 18)  SpO2: 94% (09 Feb 2024 06:03) (94% - 99%)    Parameters below as of 09 Feb 2024 06:03  Patient On (Oxygen Delivery Method): room air        PHYSICAL EXAM:    General: alert, in no acute distress  HEENT: NC/AT; PERRL, anicteric sclera; MMM  Neck: supple  Cardiovascular: +S1/S2, RRR  Respiratory: CTA B/L; no W/R/R  Gastrointestinal: Mild tenderness over RUQ to deep palpation, soft, ND; +BSx4  Extremities: WWP; no edema, clubbing or cyanosis  Vascular: 2+ radial, DP/PT pulses B/L  Neurological: AAOx3; no focal deficits    MEDICATIONS:  MEDICATIONS  (STANDING):  cefTRIAXone   IVPB 1000 milliGRAM(s) IV Intermittent every 24 hours  enoxaparin Injectable 30 milliGRAM(s) SubCutaneous every 24 hours  lactated ringers. 1000 milliLiter(s) (150 mL/Hr) IV Continuous <Continuous>  multivitamin/minerals 1 Tablet(s) Oral daily  pantoprazole    Tablet 40 milliGRAM(s) Oral before breakfast  vancomycin    Solution 125 milliGRAM(s) Oral every 6 hours    MEDICATIONS  (PRN):  acetaminophen     Tablet .. 650 milliGRAM(s) Oral every 6 hours PRN Temp greater or equal to 38C (100.4F), Mild Pain (1 - 3)  aluminum hydroxide/magnesium hydroxide/simethicone Suspension 30 milliLiter(s) Oral every 4 hours PRN Dyspepsia  melatonin 3 milliGRAM(s) Oral at bedtime PRN Insomnia  ondansetron Injectable 4 milliGRAM(s) IV Push every 8 hours PRN Nausea and/or Vomiting      ALLERGIES:  Allergies    fish (Rash)  No Known Drug Allergies    Intolerances        LABS:                        12.5   31.04 )-----------( 322      ( 08 Feb 2024 11:30 )             37.6     02-08    135  |  103  |  11  ----------------------------<  125<H>  3.6   |  21<L>  |  0.79    Ca    8.4      08 Feb 2024 22:22  Phos  2.8     02-08  Mg     1.7     02-08    TPro  5.7<L>  /  Alb  3.0<L>  /  TBili  0.4  /  DBili  x   /  AST  36  /  ALT  77<H>  /  AlkPhos  329<H>  02-08      Urinalysis Basic - ( 08 Feb 2024 22:22 )    Color: x / Appearance: x / SG: x / pH: x  Gluc: 125 mg/dL / Ketone: x  / Bili: x / Urobili: x   Blood: x / Protein: x / Nitrite: x   Leuk Esterase: x / RBC: x / WBC x   Sq Epi: x / Non Sq Epi: x / Bacteria: x      CAPILLARY BLOOD GLUCOSE          RADIOLOGY & ADDITIONAL TESTS: Reviewed.

## 2024-02-09 NOTE — PROGRESS NOTE ADULT - ASSESSMENT
78 year old female w/ recent multiple admissions for cholecystectomy and cholangitis, dc'd on PO abx, later found to have positive BCx who returns now with severe sepsis and diarrhea 2/2 C. Diff    Recommendations  - c/w CTX 1g qd   - c/w PO vanc 125mg q6h  - f/u BCx  - f/u TTE 78 year old female w/ recent multiple admissions for cholecystectomy and cholangitis, dc'd on PO abx, later found to have positive BCx who returns now with severe sepsis and diarrhea 2/2 C. Diff    Recommendations  - c/w CTX 1g qd   - c/w PO vanc 125mg q6h  - f/u BCx  - f/u TTE    Tentative OPAT plan  - single lumen midline next week prior to discharge  - Discharge patient with ceftriaxone 1g IV q24h via [facility]  - Duration of antibiotics is 12 days ( 2/8 - 2/19 )  - Weekly labs: CMP, CBC faxed to ID office at [phone]  - Patient to follow up with Dr. english in 2 weeks ([address] [place] [address], [phone]), ID office will call patient to schedule

## 2024-02-09 NOTE — DIETITIAN INITIAL EVALUATION ADULT - SIGNS/SYMPTOMS
as evidenced by 9% weight loss in last 10 days. as evidenced by 9% weight loss in last 10 days, suspected <50% nutrition needs >5 days

## 2024-02-09 NOTE — DIETITIAN INITIAL EVALUATION ADULT - ADD RECOMMEND
1. Continue with regular (low fat) diet.  2. Consider oral nutrition supplement or liberalizing diet should intake decline </= 50% ????  3. Encourage and monitor PO intake, honor preferences as able   4. Monitor labs, weight trends, GI function, and skin integrity    5. Pain and bowel regimen per team   6. Monitor adherence to diet education   7. RD to remain available and follow-up  1. Continue with Regular diet (texture/consistency per team/SLP). Recommend add Banatrol 2x/day.   2. Encourage and monitor PO intake, honor preferences as able   3. Monitor labs, weight trends, GI function, and skin integrity    4. Pain and bowel regimen per team   5. Monitor adherence to diet education   6. RD to remain available and follow-up

## 2024-02-09 NOTE — DIETITIAN INITIAL EVALUATION ADULT - NS FNS REASON FOR WEIGHT CHANG
surgery and post surgery complications/other (specify) c.diff/fluid retention/altered GI function (specify) c.diff/altered GI function (specify)

## 2024-02-09 NOTE — DIETITIAN INITIAL EVALUATION ADULT - PERTINENT LABORATORY DATA
02-09    138  |  107  |  9   ----------------------------<  109<H>  3.8   |  22  |  0.72    Ca    8.3<L>      09 Feb 2024 05:30  Phos  2.6     02-09  Mg     1.6     02-09    TPro  5.5<L>  /  Alb  2.9<L>  /  TBili  0.5  /  DBili  x   /  AST  25  /  ALT  60<H>  /  AlkPhos  317<H>  02-09

## 2024-02-09 NOTE — PROGRESS NOTE ADULT - PROBLEM SELECTOR PLAN 2
#r/o c diff   Patient with more than 25 episodes of diarrhea since the night prior to admission. Denies bloody bowel movement. Denies abdominal pain. Reports fevers and chills.   C diff toxins indeterminant, GI PCR positive for C. diff    - vancomycin 125 mg PO q6hrs for 10-14 days  - LR IVF  - f/u ID recs

## 2024-02-09 NOTE — DIETITIAN NUTRITION RISK NOTIFICATION - ADDITIONAL COMMENTS/DIETITIAN RECOMMENDATIONS
Continue with Regular diet (texture/consistency per team/SLP). Recommend add Banatrol 2x/day.  1. Continue with Regular diet (texture/consistency per team/SLP). Recommend add Banatrol 2x/day.   2. Encourage and monitor PO intake, honor preferences as able   3. Monitor labs, weight trends, GI function, and skin integrity    4. Pain and bowel regimen per team   5. Monitor adherence to diet education   6. RD to remain available and follow-up  patient instructed; verbal instruction; adequate PO intake as tolerated, Banatrol with meals

## 2024-02-09 NOTE — PROGRESS NOTE ADULT - PROBLEM SELECTOR PLAN 3
BCx from 01/31 positive for several Strept species and R. Mucilaginosa, possible contaminant. Patient was dc'd on Augmentin and was called later on with change in medication to cefpodoxime 200mg BID.     - As above  - Obtain TTE per ID recs

## 2024-02-09 NOTE — DIETITIAN INITIAL EVALUATION ADULT - OTHER INFO
78 year old female with history of GERD, diverticulitis, PSVT, hepatitis C, recent multiple admissions for cholecystectomy, cholangitis, dc'd on PO abx, returning for diarrhea and generalized weakness. Patient was found to have positive BCx w/ Strept mitis/oralis species, Strept salivarius, and Rothia mucilaginosa on prior admission. Patient states someone called her to change her PO regimen likely due to this finding. Now patient is presenting to ED with complaints of over 25 episodes of liquid stools with mucus, no blood, that started last night, no nausea, no vomiting, patient admits to decreased appetite chills, overall increased fatigue and weakness, denies fever.     Patient seen at bedside for _____assessment. Currently on ____ diet. No known food allergies. No difficulty chewing/swallowing reported. Reports ____appetite. Patient consumed ____% of breakfast which included ______. PTA, patient’s usual meals consisted of__________. Dosing weight: ____pounds, BMI ____, reports Usual Body Weight of ____ pounds. Ideal Body weight ______, _____%. No pressure injuries documented. No edema documented. Denies nausea, comiting, diarrhea, constipation. Labs:____ Medications: _____. Observed with **no overt** signs and symptoms of muscle or fat wasting. Based on ASPEN guidelines, patient __does not**does__ currently meet criteria for malnutrition. No cultural, ethnic, Amish food preferences noted. See nutrition recommendations below.     Nutrition education   Status post interval cholecystectomy          Reports having lost 5 pounds since her cholecystectomy, ~ two weeks back. This is a significant weight loss (4.2% x 2 weeks)     With ideal body weight of 125 pounds, dosing weight at 125 pounds is 92.5% of ideal body weight. This is a significant weight loss (4.2% x 2 weeks). Her usual body weight is 124 pounds, BMI 19.8.     Reported<50% PO intake x 5days. W          78 year old female with history of GERD, diverticulitis, PSVT, hepatitis C, recent multiple admissions for cholecystectomy, cholangitis, dc'd on PO abx, returning for diarrhea and generalized weakness. Patient was found to have positive BCx w/ Strept mitis/oralis species, Strept salivarius, and Rothia mucilaginosa on prior admission. Patient states someone called her to change her PO regimen likely due to this finding. Now patient is presenting to ED with complaints of over 25 episodes of liquid stools with mucus, no blood, that started last night, no nausea, no vomiting, patient admits to decreased appetite chills, overall increased fatigue and weakness, denies fever.            Patient seen at bedside for nutrition assessment. Currently on regular (low-fat)diet. No known food allergies or any difficulty chewing/swallowing reported. Reports poor appetite?? She reports of having immediate bowel movement after ingesting anything. Patient consumed <25% of breakfast. Pt in-house on 1/31 for complications post cholecystectomy. Dosing weight: 110 pounds, BMI 18.3, reports Usual Body Weight of 124 pounds. Ideal Body weight 125 pounds, 88%. No pressure injuries or edema documented. Denies nausea, vomiting, or constipation. Admission for profuse diarrhea.   Banatrol,,,Labs:glucose 102-125 x 24 hours. Medications: MVI, antacid, pantoprazole, ondansetron. NFPE not feasible at this time. However based on ASPEN guidelines, pt meets the criteria for mild malnutrition___ No cultural, ethnic, Hinduism food preferences noted. See nutrition recommendations below.              78 year old female with history of GERD, diverticulitis, PSVT, hepatitis C, recent multiple admissions for cholecystectomy, cholangitis, dc'd on PO abx, returning for diarrhea and generalized weakness. Patient was found to have positive BCx w/ Strept mitis/oralis species, Strept salivarius, and Rothia mucilaginosa on prior admission. Patient states someone called her to change her PO regimen likely due to this finding. Now patient is presenting to ED with complaints of over 25 episodes of liquid stools with mucus, no blood, that started last night, no nausea, no vomiting, patient admits to decreased appetite chills, overall increased fatigue and weakness, denies fever.      Patient seen at bedside for nutrition assessment, pt with previous admit on 1/31. Currently on easy to chew diet. No known food allergies or any difficulty chewing/swallowing reported. Reports good appetite, however she reports of having immediate bowel movement after ingesting anything. Patient consumed 1 grape for breakfast this AM and immediately felt the urge to go to the bathroom. Dosing weight: 110 pounds, BMI 18.3, reports Usual Body Weight of 124 pounds. On previous admit, pt with dosing weight of 119 on 1/31/24. Suggests 9#/8% weight loss x 10 days, clinically significant. No pressure injuries or edema documented. Denies nausea/vomiting. Labs: glucose 109-125 x 24 hours, ALP and ALT elevated. Medications: MVI, antacid, pantoprazole, ondansetron. NFPE not feasible at this time. However based on ASPEN guidelines, pt meets the criteria for severe malnutrition. Nutrition education provided. Encouraged adequate PO intake as tolerated. Pt amenable to add Banatrol BID. See nutrition recommendations below.

## 2024-02-10 LAB
ALBUMIN SERPL ELPH-MCNC: 3 G/DL — LOW (ref 3.3–5)
ALP SERPL-CCNC: 274 U/L — HIGH (ref 40–120)
ALT FLD-CCNC: 53 U/L — HIGH (ref 10–45)
ANION GAP SERPL CALC-SCNC: 8 MMOL/L — SIGNIFICANT CHANGE UP (ref 5–17)
AST SERPL-CCNC: 23 U/L — SIGNIFICANT CHANGE UP (ref 10–40)
BASOPHILS # BLD AUTO: 0.06 K/UL — SIGNIFICANT CHANGE UP (ref 0–0.2)
BASOPHILS NFR BLD AUTO: 0.4 % — SIGNIFICANT CHANGE UP (ref 0–2)
BILIRUB SERPL-MCNC: 0.3 MG/DL — SIGNIFICANT CHANGE UP (ref 0.2–1.2)
BUN SERPL-MCNC: 6 MG/DL — LOW (ref 7–23)
CALCIUM SERPL-MCNC: 8.9 MG/DL — SIGNIFICANT CHANGE UP (ref 8.4–10.5)
CHLORIDE SERPL-SCNC: 106 MMOL/L — SIGNIFICANT CHANGE UP (ref 96–108)
CO2 SERPL-SCNC: 26 MMOL/L — SIGNIFICANT CHANGE UP (ref 22–31)
CREAT SERPL-MCNC: 0.75 MG/DL — SIGNIFICANT CHANGE UP (ref 0.5–1.3)
EGFR: 82 ML/MIN/1.73M2 — SIGNIFICANT CHANGE UP
EOSINOPHIL # BLD AUTO: 1.44 K/UL — HIGH (ref 0–0.5)
EOSINOPHIL NFR BLD AUTO: 10.7 % — HIGH (ref 0–6)
GLUCOSE SERPL-MCNC: 103 MG/DL — HIGH (ref 70–99)
HCT VFR BLD CALC: 35.8 % — SIGNIFICANT CHANGE UP (ref 34.5–45)
HGB BLD-MCNC: 11.5 G/DL — SIGNIFICANT CHANGE UP (ref 11.5–15.5)
IMM GRANULOCYTES NFR BLD AUTO: 0.4 % — SIGNIFICANT CHANGE UP (ref 0–0.9)
LYMPHOCYTES # BLD AUTO: 14.9 % — SIGNIFICANT CHANGE UP (ref 13–44)
LYMPHOCYTES # BLD AUTO: 2.01 K/UL — SIGNIFICANT CHANGE UP (ref 1–3.3)
MAGNESIUM SERPL-MCNC: 2.1 MG/DL — SIGNIFICANT CHANGE UP (ref 1.6–2.6)
MCHC RBC-ENTMCNC: 30.5 PG — SIGNIFICANT CHANGE UP (ref 27–34)
MCHC RBC-ENTMCNC: 32.1 GM/DL — SIGNIFICANT CHANGE UP (ref 32–36)
MCV RBC AUTO: 95 FL — SIGNIFICANT CHANGE UP (ref 80–100)
MONOCYTES # BLD AUTO: 0.89 K/UL — SIGNIFICANT CHANGE UP (ref 0–0.9)
MONOCYTES NFR BLD AUTO: 6.6 % — SIGNIFICANT CHANGE UP (ref 2–14)
NEUTROPHILS # BLD AUTO: 9.07 K/UL — HIGH (ref 1.8–7.4)
NEUTROPHILS NFR BLD AUTO: 67 % — SIGNIFICANT CHANGE UP (ref 43–77)
NRBC # BLD: 0 /100 WBCS — SIGNIFICANT CHANGE UP (ref 0–0)
PHOSPHATE SERPL-MCNC: 3.1 MG/DL — SIGNIFICANT CHANGE UP (ref 2.5–4.5)
PLATELET # BLD AUTO: 245 K/UL — SIGNIFICANT CHANGE UP (ref 150–400)
POTASSIUM SERPL-MCNC: 3.9 MMOL/L — SIGNIFICANT CHANGE UP (ref 3.5–5.3)
POTASSIUM SERPL-SCNC: 3.9 MMOL/L — SIGNIFICANT CHANGE UP (ref 3.5–5.3)
PROT SERPL-MCNC: 5.6 G/DL — LOW (ref 6–8.3)
RBC # BLD: 3.77 M/UL — LOW (ref 3.8–5.2)
RBC # FLD: 13.5 % — SIGNIFICANT CHANGE UP (ref 10.3–14.5)
SODIUM SERPL-SCNC: 140 MMOL/L — SIGNIFICANT CHANGE UP (ref 135–145)
WBC # BLD: 13.52 K/UL — HIGH (ref 3.8–10.5)
WBC # FLD AUTO: 13.52 K/UL — HIGH (ref 3.8–10.5)

## 2024-02-10 PROCEDURE — 99232 SBSQ HOSP IP/OBS MODERATE 35: CPT

## 2024-02-10 RX ADMIN — Medication 125 MILLIGRAM(S): at 11:58

## 2024-02-10 RX ADMIN — SODIUM CHLORIDE 150 MILLILITER(S): 9 INJECTION, SOLUTION INTRAVENOUS at 21:33

## 2024-02-10 RX ADMIN — PANTOPRAZOLE SODIUM 40 MILLIGRAM(S): 20 TABLET, DELAYED RELEASE ORAL at 06:00

## 2024-02-10 RX ADMIN — Medication 1 TABLET(S): at 11:57

## 2024-02-10 RX ADMIN — ENOXAPARIN SODIUM 30 MILLIGRAM(S): 100 INJECTION SUBCUTANEOUS at 21:33

## 2024-02-10 RX ADMIN — Medication 125 MILLIGRAM(S): at 17:27

## 2024-02-10 RX ADMIN — Medication 125 MILLIGRAM(S): at 00:16

## 2024-02-10 RX ADMIN — CEFTRIAXONE 100 MILLIGRAM(S): 500 INJECTION, POWDER, FOR SOLUTION INTRAMUSCULAR; INTRAVENOUS at 13:54

## 2024-02-10 RX ADMIN — Medication 125 MILLIGRAM(S): at 23:03

## 2024-02-10 RX ADMIN — Medication 125 MILLIGRAM(S): at 05:59

## 2024-02-10 NOTE — PROGRESS NOTE ADULT - ASSESSMENT
78F h/o cholelithiasis s/p laparoscopic cholecystectomy on 1/25/24 c/b cholangitis with high-grade bacteremia with Strep mitis/oralis and Rothia d/luisa on po antibiotics p/w acute onset of diarrhea x 1 day, found to have C. diff diarrhea. She resumed IV therapy for bacteremia.  She is markedly clinically improved, afebrile, WBC trending down significantly.    Suggest:  - Continue to f/u blood cultures X 2 from 2/8  - Continue ceftriaxone 1 g IV q24h  - Continue vancomycin 125 mg po q6h  Will follow with you, team 1.  I will cover through 2/11, Dr Briceño will resume care 2/12.

## 2024-02-10 NOTE — PROGRESS NOTE ADULT - PROBLEM SELECTOR PLAN 2
#r/o c diff   Patient with more than 25 episodes of diarrhea since the night prior to admission. Denies bloody bowel movement. Denies abdominal pain. Reports fevers and chills.   C diff toxins indeterminant, GI PCR positive for C. diff    - vancomycin 125 mg PO q6hrs for 10-14 days  - LR IVF  - f/u ID recs #r/o c diff   Patient with more than 25 episodes of diarrhea since the night prior to admission. Denies bloody bowel movement. Denies abdominal pain. Reports fevers and chills.   C diff toxins indeterminant, GI PCR positive for C. diff  - vancomycin 125 mg PO q6hrs for 10-14 days  - LR IVF  - f/u ID recs

## 2024-02-10 NOTE — PROGRESS NOTE ADULT - PROBLEM SELECTOR PLAN 1
Severe sepsis 2/2 c diff  +/- Strep bacteremia   T 98.4 -> 101.4, HR 99, WBC 31.04   s/p 2L NS Bolus    - CTX 1g QD for empiric coverage per ID recs  - f/u 2/8 BCx  - monitor CBC w diff  - monitor  - ID following Severe sepsis 2/2 c diff  +/- Strep bacteremia   T 98.4 -> 101.4, HR 99, WBC 31.04   s/p 2L NS Bolus    - CTX 1g QD for empiric coverage per ID recs  - BCx NGTD  - monitor CBC w diff, now down trending, no fever or tachycardia   - monitor  - ID following

## 2024-02-10 NOTE — PROGRESS NOTE ADULT - SUBJECTIVE AND OBJECTIVE BOX
INTERVAL HPI/OVERNIGHT EVENTS:  Coverage for Dr. Briceño. Afebrile, feels much better.  Had one small watery BM last night, one overnight, one this morning.    CONSTITUTIONAL:  No fever, chills, night sweats  EYES:  No photophobia or visual changes  CARDIOVASCULAR:  No chest pain  RESPIRATORY:  No cough, wheezing, or SOB   GASTROINTESTINAL:  No nausea, vomiting, constipation, or abdominal pain  GENITOURINARY:  No frequency, urgency, dysuria or hematuria  NEUROLOGIC:  No headache, lightheadedness      ANTIBIOTICS/RELEVANT:          Vital Signs Last 24 Hrs  T(C): 36.4 (10 Feb 2024 12:23), Max: 36.6 (10 Feb 2024 06:19)  T(F): 97.5 (10 Feb 2024 12:23), Max: 97.9 (10 Feb 2024 06:19)  HR: 82 (10 Feb 2024 12:23) (82 - 88)  BP: 104/68 (10 Feb 2024 12:23) (104/68 - 111/68)  BP(mean): 82 (10 Feb 2024 06:19) (82 - 82)  RR: 18 (10 Feb 2024 12:23) (17 - 18)  SpO2: 97% (10 Feb 2024 12:23) (93% - 97%)    Parameters below as of 10 Feb 2024 12:23  Patient On (Oxygen Delivery Method): room air        PHYSICAL EXAM:  Constitutional:  Alert  HEENT:  NC, Sclerae anicteric, conjunctivae clear, PERRL.  No nasal exudate or sinus tenderness;  No buccal mucosal lesions, no pharyngeal erythema or exudate	  Neck:  Supple, no adenopathy  Respiratory:  Clear bilaterally  Cardiovascular:  RRR, S1S2, no murmur appreciated  Gastrointestinal:  Symmetric, normoactive BS, soft, NT, no masses, guarding or rebound.  No HSM  Extremities:  No edema      LABS:                        11.5   13.52 )-----------( 245      ( 10 Feb 2024 05:30 )             35.8         02-10    140  |  106  |  6<L>  ----------------------------<  103<H>  3.9   |  26  |  0.75    Ca    8.9      10 Feb 2024 05:30  Phos  3.1     02-10  Mg     2.1     02-10    TPro  5.6<L>  /  Alb  3.0<L>  /  TBili  0.3  /  DBili  x   /  AST  23  /  ALT  53<H>  /  AlkPhos  274<H>  02-10      Urinalysis Basic - ( 10 Feb 2024 05:30 )    Color: x / Appearance: x / SG: x / pH: x  Gluc: 103 mg/dL / Ketone: x  / Bili: x / Urobili: x   Blood: x / Protein: x / Nitrite: x   Leuk Esterase: x / RBC: x / WBC x   Sq Epi: x / Non Sq Epi: x / Bacteria: x        MICROBIOLOGY:        RADIOLOGY & ADDITIONAL STUDIES:

## 2024-02-10 NOTE — PROGRESS NOTE ADULT - PROBLEM SELECTOR PLAN 3
BCx from 01/31 positive for several Strept species and R. Mucilaginosa, possible contaminant. Patient was dc'd on Augmentin and was called later on with change in medication to cefpodoxime 200mg BID.     - As above  - Obtain TTE per ID recs BCx from 01/31 positive for several Strept species and R. Mucilaginosa, possible contaminant. Patient was dc'd on Augmentin and was called later on with change in medication to cefpodoxime 200mg BID.   - As above  - Obtain TTE per ID recs

## 2024-02-11 LAB
ALBUMIN SERPL ELPH-MCNC: 2.7 G/DL — LOW (ref 3.3–5)
ALP SERPL-CCNC: 237 U/L — HIGH (ref 40–120)
ALT FLD-CCNC: 41 U/L — SIGNIFICANT CHANGE UP (ref 10–45)
ANION GAP SERPL CALC-SCNC: 8 MMOL/L — SIGNIFICANT CHANGE UP (ref 5–17)
AST SERPL-CCNC: 20 U/L — SIGNIFICANT CHANGE UP (ref 10–40)
BASOPHILS # BLD AUTO: 0.07 K/UL — SIGNIFICANT CHANGE UP (ref 0–0.2)
BASOPHILS NFR BLD AUTO: 0.7 % — SIGNIFICANT CHANGE UP (ref 0–2)
BILIRUB SERPL-MCNC: 0.3 MG/DL — SIGNIFICANT CHANGE UP (ref 0.2–1.2)
BUN SERPL-MCNC: 3 MG/DL — LOW (ref 7–23)
CALCIUM SERPL-MCNC: 8.4 MG/DL — SIGNIFICANT CHANGE UP (ref 8.4–10.5)
CHLORIDE SERPL-SCNC: 106 MMOL/L — SIGNIFICANT CHANGE UP (ref 96–108)
CO2 SERPL-SCNC: 27 MMOL/L — SIGNIFICANT CHANGE UP (ref 22–31)
CREAT SERPL-MCNC: 0.62 MG/DL — SIGNIFICANT CHANGE UP (ref 0.5–1.3)
EGFR: 92 ML/MIN/1.73M2 — SIGNIFICANT CHANGE UP
EOSINOPHIL # BLD AUTO: 1.62 K/UL — HIGH (ref 0–0.5)
EOSINOPHIL NFR BLD AUTO: 16.6 % — HIGH (ref 0–6)
GLUCOSE SERPL-MCNC: 99 MG/DL — SIGNIFICANT CHANGE UP (ref 70–99)
HCT VFR BLD CALC: 34.9 % — SIGNIFICANT CHANGE UP (ref 34.5–45)
HGB BLD-MCNC: 11.4 G/DL — LOW (ref 11.5–15.5)
IMM GRANULOCYTES NFR BLD AUTO: 0.5 % — SIGNIFICANT CHANGE UP (ref 0–0.9)
LYMPHOCYTES # BLD AUTO: 2.14 K/UL — SIGNIFICANT CHANGE UP (ref 1–3.3)
LYMPHOCYTES # BLD AUTO: 22 % — SIGNIFICANT CHANGE UP (ref 13–44)
MAGNESIUM SERPL-MCNC: 1.8 MG/DL — SIGNIFICANT CHANGE UP (ref 1.6–2.6)
MCHC RBC-ENTMCNC: 30.6 PG — SIGNIFICANT CHANGE UP (ref 27–34)
MCHC RBC-ENTMCNC: 32.7 GM/DL — SIGNIFICANT CHANGE UP (ref 32–36)
MCV RBC AUTO: 93.6 FL — SIGNIFICANT CHANGE UP (ref 80–100)
MONOCYTES # BLD AUTO: 0.84 K/UL — SIGNIFICANT CHANGE UP (ref 0–0.9)
MONOCYTES NFR BLD AUTO: 8.6 % — SIGNIFICANT CHANGE UP (ref 2–14)
NEUTROPHILS # BLD AUTO: 5.01 K/UL — SIGNIFICANT CHANGE UP (ref 1.8–7.4)
NEUTROPHILS NFR BLD AUTO: 51.6 % — SIGNIFICANT CHANGE UP (ref 43–77)
NRBC # BLD: 0 /100 WBCS — SIGNIFICANT CHANGE UP (ref 0–0)
PHOSPHATE SERPL-MCNC: 3 MG/DL — SIGNIFICANT CHANGE UP (ref 2.5–4.5)
PLATELET # BLD AUTO: 268 K/UL — SIGNIFICANT CHANGE UP (ref 150–400)
POTASSIUM SERPL-MCNC: 3.6 MMOL/L — SIGNIFICANT CHANGE UP (ref 3.5–5.3)
POTASSIUM SERPL-SCNC: 3.6 MMOL/L — SIGNIFICANT CHANGE UP (ref 3.5–5.3)
PROT SERPL-MCNC: 5.7 G/DL — LOW (ref 6–8.3)
RBC # BLD: 3.73 M/UL — LOW (ref 3.8–5.2)
RBC # FLD: 13.5 % — SIGNIFICANT CHANGE UP (ref 10.3–14.5)
SODIUM SERPL-SCNC: 141 MMOL/L — SIGNIFICANT CHANGE UP (ref 135–145)
WBC # BLD: 9.73 K/UL — SIGNIFICANT CHANGE UP (ref 3.8–10.5)
WBC # FLD AUTO: 9.73 K/UL — SIGNIFICANT CHANGE UP (ref 3.8–10.5)

## 2024-02-11 PROCEDURE — 99232 SBSQ HOSP IP/OBS MODERATE 35: CPT

## 2024-02-11 RX ORDER — POTASSIUM CHLORIDE 20 MEQ
40 PACKET (EA) ORAL ONCE
Refills: 0 | Status: COMPLETED | OUTPATIENT
Start: 2024-02-11 | End: 2024-02-11

## 2024-02-11 RX ADMIN — Medication 125 MILLIGRAM(S): at 17:56

## 2024-02-11 RX ADMIN — CEFTRIAXONE 100 MILLIGRAM(S): 500 INJECTION, POWDER, FOR SOLUTION INTRAMUSCULAR; INTRAVENOUS at 15:10

## 2024-02-11 RX ADMIN — Medication 125 MILLIGRAM(S): at 11:26

## 2024-02-11 RX ADMIN — Medication 40 MILLIEQUIVALENT(S): at 07:45

## 2024-02-11 RX ADMIN — Medication 1 TABLET(S): at 11:26

## 2024-02-11 RX ADMIN — SODIUM CHLORIDE 150 MILLILITER(S): 9 INJECTION, SOLUTION INTRAVENOUS at 06:10

## 2024-02-11 RX ADMIN — PANTOPRAZOLE SODIUM 40 MILLIGRAM(S): 20 TABLET, DELAYED RELEASE ORAL at 06:09

## 2024-02-11 RX ADMIN — Medication 125 MILLIGRAM(S): at 06:09

## 2024-02-11 RX ADMIN — ENOXAPARIN SODIUM 30 MILLIGRAM(S): 100 INJECTION SUBCUTANEOUS at 21:28

## 2024-02-11 RX ADMIN — SODIUM CHLORIDE 150 MILLILITER(S): 9 INJECTION, SOLUTION INTRAVENOUS at 17:57

## 2024-02-11 RX ADMIN — SODIUM CHLORIDE 150 MILLILITER(S): 9 INJECTION, SOLUTION INTRAVENOUS at 11:29

## 2024-02-11 RX ADMIN — Medication 125 MILLIGRAM(S): at 23:09

## 2024-02-11 NOTE — PROGRESS NOTE ADULT - PROBLEM SELECTOR PLAN 1
Severe sepsis 2/2 c diff  +/- Strep bacteremia   T 98.4 -> 101.4, HR 99, WBC 31.04   s/p 2L NS Bolus    - CTX 1g QD for empiric coverage per ID recs  - BCx NGTD  - monitor CBC w diff, now down trending, no fever or tachycardia   - monitor  - ID following

## 2024-02-11 NOTE — PROGRESS NOTE ADULT - ASSESSMENT
79 yo woman with pmhx of GERD, diverticulitis, PSVT, HEP C treated over 20 years ago, and recent multiple admissions for cholecystectomy, cholangitis, returning for severe diarrhea and weakness in the setting of antibiotic use and positive BCx on 1/31 growing multiple bacteria including strep species found to in severe sepsis and have a GI PCR positive for C. diff.

## 2024-02-11 NOTE — PROGRESS NOTE ADULT - SUBJECTIVE AND OBJECTIVE BOX
Doing much better today.  Had a formed bowel movement.  Denies any pain.       Remaining ROS negative       PHYSICAL EXAM:    General: NAd, sitting up in bed  HEENT: NC/AT; MMM  Cardiovascular: +S1/S2, RRR  Respiratory: CTA B/L; no W/R/R  Gastrointestinal: soft, NT/ND; +BSx4  Extremities: WWP; no edema  Neurological: speech is fluent, no facial asymmetry, follows commands, moves all extremities  Psychiatric: pleasant mood and affect  Dermatologic: no appreciable wounds or damage to the skin    VITAL SIGNS:  Vital Signs Last 24 Hrs  T(C): 36.6 (11 Feb 2024 06:11), Max: 36.8 (10 Feb 2024 20:34)  T(F): 97.9 (11 Feb 2024 06:11), Max: 98.3 (10 Feb 2024 20:34)  HR: 76 (11 Feb 2024 06:11) (76 - 88)  BP: 138/81 (11 Feb 2024 06:11) (116/80 - 138/81)  BP(mean): 100 (11 Feb 2024 06:11) (100 - 100)  RR: 17 (11 Feb 2024 06:11) (17 - 17)  SpO2: 94% (11 Feb 2024 06:11) (94% - 98%)    Parameters below as of 11 Feb 2024 06:11  Patient On (Oxygen Delivery Method): room air          MEDICATIONS:  MEDICATIONS  (STANDING):  cefTRIAXone   IVPB 1000 milliGRAM(s) IV Intermittent every 24 hours  enoxaparin Injectable 30 milliGRAM(s) SubCutaneous every 24 hours  lactated ringers. 1000 milliLiter(s) (150 mL/Hr) IV Continuous <Continuous>  multivitamin/minerals 1 Tablet(s) Oral daily  pantoprazole    Tablet 40 milliGRAM(s) Oral before breakfast  vancomycin    Solution 125 milliGRAM(s) Oral every 6 hours    MEDICATIONS  (PRN):  acetaminophen     Tablet .. 650 milliGRAM(s) Oral every 6 hours PRN Temp greater or equal to 38C (100.4F), Mild Pain (1 - 3)  aluminum hydroxide/magnesium hydroxide/simethicone Suspension 30 milliLiter(s) Oral every 4 hours PRN Dyspepsia  melatonin 3 milliGRAM(s) Oral at bedtime PRN Insomnia  ondansetron Injectable 4 milliGRAM(s) IV Push every 8 hours PRN Nausea and/or Vomiting      ALLERGIES:  Allergies    fish (Rash)  No Known Drug Allergies    Intolerances        LABS:                        11.4   9.73  )-----------( 268      ( 11 Feb 2024 05:30 )             34.9     02-11    141  |  106  |  3<L>  ----------------------------<  99  3.6   |  27  |  0.62    Ca    8.4      11 Feb 2024 05:30  Phos  3.0     02-11  Mg     1.8     02-11    TPro  5.7<L>  /  Alb  2.7<L>  /  TBili  0.3  /  DBili  x   /  AST  20  /  ALT  41  /  AlkPhos  237<H>  02-11      Urinalysis Basic - ( 11 Feb 2024 05:30 )    Color: x / Appearance: x / SG: x / pH: x  Gluc: 99 mg/dL / Ketone: x  / Bili: x / Urobili: x   Blood: x / Protein: x / Nitrite: x   Leuk Esterase: x / RBC: x / WBC x   Sq Epi: x / Non Sq Epi: x / Bacteria: x      CAPILLARY BLOOD GLUCOSE          RADIOLOGY & ADDITIONAL TESTS: Reviewed.

## 2024-02-12 ENCOUNTER — TRANSCRIPTION ENCOUNTER (OUTPATIENT)
Age: 79
End: 2024-02-12

## 2024-02-12 ENCOUNTER — RESULT REVIEW (OUTPATIENT)
Age: 79
End: 2024-02-12

## 2024-02-12 VITALS
SYSTOLIC BLOOD PRESSURE: 159 MMHG | TEMPERATURE: 98 F | RESPIRATION RATE: 17 BRPM | OXYGEN SATURATION: 98 % | HEART RATE: 86 BPM | DIASTOLIC BLOOD PRESSURE: 76 MMHG

## 2024-02-12 LAB
ANION GAP SERPL CALC-SCNC: 10 MMOL/L — SIGNIFICANT CHANGE UP (ref 5–17)
BASOPHILS # BLD AUTO: 0.07 K/UL — SIGNIFICANT CHANGE UP (ref 0–0.2)
BASOPHILS NFR BLD AUTO: 1 % — SIGNIFICANT CHANGE UP (ref 0–2)
BUN SERPL-MCNC: 4 MG/DL — LOW (ref 7–23)
CALCIUM SERPL-MCNC: 8.4 MG/DL — SIGNIFICANT CHANGE UP (ref 8.4–10.5)
CHLORIDE SERPL-SCNC: 104 MMOL/L — SIGNIFICANT CHANGE UP (ref 96–108)
CO2 SERPL-SCNC: 24 MMOL/L — SIGNIFICANT CHANGE UP (ref 22–31)
CREAT SERPL-MCNC: 0.57 MG/DL — SIGNIFICANT CHANGE UP (ref 0.5–1.3)
EGFR: 94 ML/MIN/1.73M2 — SIGNIFICANT CHANGE UP
EOSINOPHIL # BLD AUTO: 0.86 K/UL — HIGH (ref 0–0.5)
EOSINOPHIL NFR BLD AUTO: 11.9 % — HIGH (ref 0–6)
GLUCOSE SERPL-MCNC: 86 MG/DL — SIGNIFICANT CHANGE UP (ref 70–99)
HCT VFR BLD CALC: 32.9 % — LOW (ref 34.5–45)
HGB BLD-MCNC: 10.4 G/DL — LOW (ref 11.5–15.5)
IMM GRANULOCYTES NFR BLD AUTO: 0.7 % — SIGNIFICANT CHANGE UP (ref 0–0.9)
LYMPHOCYTES # BLD AUTO: 1.28 K/UL — SIGNIFICANT CHANGE UP (ref 1–3.3)
LYMPHOCYTES # BLD AUTO: 17.7 % — SIGNIFICANT CHANGE UP (ref 13–44)
MAGNESIUM SERPL-MCNC: 1.5 MG/DL — LOW (ref 1.6–2.6)
MCHC RBC-ENTMCNC: 30.3 PG — SIGNIFICANT CHANGE UP (ref 27–34)
MCHC RBC-ENTMCNC: 31.6 GM/DL — LOW (ref 32–36)
MCV RBC AUTO: 95.9 FL — SIGNIFICANT CHANGE UP (ref 80–100)
MONOCYTES # BLD AUTO: 0.51 K/UL — SIGNIFICANT CHANGE UP (ref 0–0.9)
MONOCYTES NFR BLD AUTO: 7.1 % — SIGNIFICANT CHANGE UP (ref 2–14)
NEUTROPHILS # BLD AUTO: 4.45 K/UL — SIGNIFICANT CHANGE UP (ref 1.8–7.4)
NEUTROPHILS NFR BLD AUTO: 61.6 % — SIGNIFICANT CHANGE UP (ref 43–77)
NRBC # BLD: 0 /100 WBCS — SIGNIFICANT CHANGE UP (ref 0–0)
PHOSPHATE SERPL-MCNC: 2.5 MG/DL — SIGNIFICANT CHANGE UP (ref 2.5–4.5)
PLATELET # BLD AUTO: 260 K/UL — SIGNIFICANT CHANGE UP (ref 150–400)
POTASSIUM SERPL-MCNC: 3.8 MMOL/L — SIGNIFICANT CHANGE UP (ref 3.5–5.3)
POTASSIUM SERPL-SCNC: 3.8 MMOL/L — SIGNIFICANT CHANGE UP (ref 3.5–5.3)
RBC # BLD: 3.43 M/UL — LOW (ref 3.8–5.2)
RBC # FLD: 13.3 % — SIGNIFICANT CHANGE UP (ref 10.3–14.5)
SODIUM SERPL-SCNC: 138 MMOL/L — SIGNIFICANT CHANGE UP (ref 135–145)
WBC # BLD: 7.22 K/UL — SIGNIFICANT CHANGE UP (ref 3.8–10.5)
WBC # FLD AUTO: 7.22 K/UL — SIGNIFICANT CHANGE UP (ref 3.8–10.5)

## 2024-02-12 PROCEDURE — 87040 BLOOD CULTURE FOR BACTERIA: CPT

## 2024-02-12 PROCEDURE — 87324 CLOSTRIDIUM AG IA: CPT

## 2024-02-12 PROCEDURE — 99285 EMERGENCY DEPT VISIT HI MDM: CPT

## 2024-02-12 PROCEDURE — 96361 HYDRATE IV INFUSION ADD-ON: CPT

## 2024-02-12 PROCEDURE — 99232 SBSQ HOSP IP/OBS MODERATE 35: CPT | Mod: GC

## 2024-02-12 PROCEDURE — 87507 IADNA-DNA/RNA PROBE TQ 12-25: CPT

## 2024-02-12 PROCEDURE — 87449 NOS EACH ORGANISM AG IA: CPT

## 2024-02-12 PROCEDURE — 85025 COMPLETE CBC W/AUTO DIFF WBC: CPT

## 2024-02-12 PROCEDURE — 93306 TTE W/DOPPLER COMPLETE: CPT

## 2024-02-12 PROCEDURE — 74019 RADEX ABDOMEN 2 VIEWS: CPT

## 2024-02-12 PROCEDURE — 83690 ASSAY OF LIPASE: CPT

## 2024-02-12 PROCEDURE — 36415 COLL VENOUS BLD VENIPUNCTURE: CPT

## 2024-02-12 PROCEDURE — 99239 HOSP IP/OBS DSCHRG MGMT >30: CPT

## 2024-02-12 PROCEDURE — 93306 TTE W/DOPPLER COMPLETE: CPT | Mod: 26

## 2024-02-12 PROCEDURE — 84100 ASSAY OF PHOSPHORUS: CPT

## 2024-02-12 PROCEDURE — 96360 HYDRATION IV INFUSION INIT: CPT

## 2024-02-12 PROCEDURE — 83735 ASSAY OF MAGNESIUM: CPT

## 2024-02-12 PROCEDURE — 87493 C DIFF AMPLIFIED PROBE: CPT

## 2024-02-12 PROCEDURE — 80048 BASIC METABOLIC PNL TOTAL CA: CPT

## 2024-02-12 PROCEDURE — 83605 ASSAY OF LACTIC ACID: CPT

## 2024-02-12 PROCEDURE — 80053 COMPREHEN METABOLIC PANEL: CPT

## 2024-02-12 PROCEDURE — 93005 ELECTROCARDIOGRAM TRACING: CPT

## 2024-02-12 RX ORDER — CEFTRIAXONE 500 MG/1
1 INJECTION, POWDER, FOR SOLUTION INTRAMUSCULAR; INTRAVENOUS
Qty: 6 | Refills: 0
Start: 2024-02-12 | End: 2024-02-17

## 2024-02-12 RX ORDER — VANCOMYCIN HCL 1 G
1 VIAL (EA) INTRAVENOUS
Qty: 44 | Refills: 0
Start: 2024-02-12 | End: 2024-02-26

## 2024-02-12 RX ORDER — MULTIVIT-MIN/FERROUS GLUCONATE 9 MG/15 ML
1 LIQUID (ML) ORAL
Qty: 0 | Refills: 0 | DISCHARGE
Start: 2024-02-12

## 2024-02-12 RX ORDER — MAGNESIUM SULFATE 500 MG/ML
2 VIAL (ML) INJECTION ONCE
Refills: 0 | Status: COMPLETED | OUTPATIENT
Start: 2024-02-12 | End: 2024-02-12

## 2024-02-12 RX ADMIN — Medication 1 TABLET(S): at 11:26

## 2024-02-12 RX ADMIN — Medication 125 MILLIGRAM(S): at 06:33

## 2024-02-12 RX ADMIN — CEFTRIAXONE 100 MILLIGRAM(S): 500 INJECTION, POWDER, FOR SOLUTION INTRAMUSCULAR; INTRAVENOUS at 15:23

## 2024-02-12 RX ADMIN — Medication 25 GRAM(S): at 09:39

## 2024-02-12 RX ADMIN — SODIUM CHLORIDE 150 MILLILITER(S): 9 INJECTION, SOLUTION INTRAVENOUS at 06:34

## 2024-02-12 RX ADMIN — Medication 125 MILLIGRAM(S): at 11:26

## 2024-02-12 RX ADMIN — PANTOPRAZOLE SODIUM 40 MILLIGRAM(S): 20 TABLET, DELAYED RELEASE ORAL at 06:33

## 2024-02-12 NOTE — PROGRESS NOTE ADULT - TIME BILLING
c.diff diarrhea, strep bacteremia
c.diff diarrhea, strep bacteremia.
face to face with patient, review of chart, care coordination and documentation.

## 2024-02-12 NOTE — DISCHARGE NOTE PROVIDER - NSDCHHBASESERVICE_GEN_ALL_CORE
77 y.o Female with HTN, HLD, abnormal cath ( severe ostial LAD)   presents to PST for 3 Vessel CABG     Hospital course:  : C3L 1 intra-op PRBC Dobutamine  :  weaned to off, transferred to step down--displaying behavior of post-op delirium, combatitve, refusing assessment   Pt remains with mild confusion, inappropriate statements, however is aware of surroundings and hospitalization.  No focal deficits .  D/c intro, chest tubes   PW removed. Lopressor changed to Toprol . Oriented x 3 however speaking rapidly with a tendency to change subject frequently.  Replete K             Nursing/Occupational therapy/Physical therapy

## 2024-02-12 NOTE — PROGRESS NOTE ADULT - SUBJECTIVE AND OBJECTIVE BOX
INFECTIOUS DISEASES CONSULT FOLLOW-UP NOTE    INTERVAL HPI/OVERNIGHT EVENTS:      ROS:   Constitutional, eyes, ENT, cardiovascular, respiratory, gastrointestinal, genitourinary, integumentary, neurological, psychiatric and heme/lymph are otherwise negative other than noted above       ANTIBIOTICS/RELEVANT:    MEDICATIONS  (STANDING):  cefTRIAXone   IVPB 1000 milliGRAM(s) IV Intermittent every 24 hours  enoxaparin Injectable 30 milliGRAM(s) SubCutaneous every 24 hours  lactated ringers. 1000 milliLiter(s) (150 mL/Hr) IV Continuous <Continuous>  multivitamin/minerals 1 Tablet(s) Oral daily  pantoprazole    Tablet 40 milliGRAM(s) Oral before breakfast  vancomycin    Solution 125 milliGRAM(s) Oral every 6 hours    MEDICATIONS  (PRN):  acetaminophen     Tablet .. 650 milliGRAM(s) Oral every 6 hours PRN Temp greater or equal to 38C (100.4F), Mild Pain (1 - 3)  aluminum hydroxide/magnesium hydroxide/simethicone Suspension 30 milliLiter(s) Oral every 4 hours PRN Dyspepsia  melatonin 3 milliGRAM(s) Oral at bedtime PRN Insomnia  ondansetron Injectable 4 milliGRAM(s) IV Push every 8 hours PRN Nausea and/or Vomiting        Vital Signs Last 24 Hrs  T(C): 36.6 (12 Feb 2024 11:42), Max: 36.8 (12 Feb 2024 06:02)  T(F): 97.9 (12 Feb 2024 11:42), Max: 98.3 (12 Feb 2024 06:02)  HR: 86 (12 Feb 2024 11:42) (81 - 88)  BP: 159/76 (12 Feb 2024 11:42) (121/67 - 159/76)  BP(mean): 85 (12 Feb 2024 06:02) (85 - 85)  RR: 17 (12 Feb 2024 11:42) (17 - 17)  SpO2: 98% (12 Feb 2024 11:42) (94% - 98%)    Parameters below as of 12 Feb 2024 11:42  Patient On (Oxygen Delivery Method): room air        PHYSICAL EXAM:  Constitutional: alert, NAD  Eyes: the sclera and conjunctiva were normal.   ENT: the ears and nose were normal in appearance.   Neck: the appearance of the neck was normal and the neck was supple.   Pulmonary: no respiratory distress and lungs were clear to auscultation bilaterally.   Heart: heart rate was normal and rhythm regular, normal S1 and S2  Vascular:. there was no peripheral edema  Abdomen: normal bowel sounds, soft, non-tender  Neurological: no focal deficits.   Psychiatric: the affect was normal        LABS:                        10.4   7.22  )-----------( 260      ( 12 Feb 2024 05:30 )             32.9     02-12    138  |  104  |  4<L>  ----------------------------<  86  3.8   |  24  |  0.57    Ca    8.4      12 Feb 2024 05:30  Phos  2.5     02-12  Mg     1.5     02-12    TPro  5.7<L>  /  Alb  2.7<L>  /  TBili  0.3  /  DBili  x   /  AST  20  /  ALT  41  /  AlkPhos  237<H>  02-11      Urinalysis Basic - ( 12 Feb 2024 05:30 )    Color: x / Appearance: x / SG: x / pH: x  Gluc: 86 mg/dL / Ketone: x  / Bili: x / Urobili: x   Blood: x / Protein: x / Nitrite: x   Leuk Esterase: x / RBC: x / WBC x   Sq Epi: x / Non Sq Epi: x / Bacteria: x        MICROBIOLOGY:      RADIOLOGY & ADDITIONAL STUDIES:  Reviewed INFECTIOUS DISEASES CONSULT FOLLOW-UP NOTE    INTERVAL HPI/OVERNIGHT EVENTS: Patient assessed at bedside, denies any new complaints. States diarrhea has completely resolved and she feels well. Denies any recent fevers or chills.      ROS:   Constitutional, eyes, ENT, cardiovascular, respiratory, gastrointestinal, genitourinary, integumentary, neurological, psychiatric and heme/lymph are otherwise negative other than noted above       ANTIBIOTICS/RELEVANT:    MEDICATIONS  (STANDING):  cefTRIAXone   IVPB 1000 milliGRAM(s) IV Intermittent every 24 hours  enoxaparin Injectable 30 milliGRAM(s) SubCutaneous every 24 hours  lactated ringers. 1000 milliLiter(s) (150 mL/Hr) IV Continuous <Continuous>  multivitamin/minerals 1 Tablet(s) Oral daily  pantoprazole    Tablet 40 milliGRAM(s) Oral before breakfast  vancomycin    Solution 125 milliGRAM(s) Oral every 6 hours    MEDICATIONS  (PRN):  acetaminophen     Tablet .. 650 milliGRAM(s) Oral every 6 hours PRN Temp greater or equal to 38C (100.4F), Mild Pain (1 - 3)  aluminum hydroxide/magnesium hydroxide/simethicone Suspension 30 milliLiter(s) Oral every 4 hours PRN Dyspepsia  melatonin 3 milliGRAM(s) Oral at bedtime PRN Insomnia  ondansetron Injectable 4 milliGRAM(s) IV Push every 8 hours PRN Nausea and/or Vomiting        Vital Signs Last 24 Hrs  T(C): 36.6 (12 Feb 2024 11:42), Max: 36.8 (12 Feb 2024 06:02)  T(F): 97.9 (12 Feb 2024 11:42), Max: 98.3 (12 Feb 2024 06:02)  HR: 86 (12 Feb 2024 11:42) (81 - 88)  BP: 159/76 (12 Feb 2024 11:42) (121/67 - 159/76)  BP(mean): 85 (12 Feb 2024 06:02) (85 - 85)  RR: 17 (12 Feb 2024 11:42) (17 - 17)  SpO2: 98% (12 Feb 2024 11:42) (94% - 98%)    Parameters below as of 12 Feb 2024 11:42  Patient On (Oxygen Delivery Method): room air        PHYSICAL EXAM:  Constitutional: alert, NAD  Eyes: the sclera and conjunctiva were normal.   ENT: the ears and nose were normal in appearance.   Neck: the appearance of the neck was normal and the neck was supple.   Pulmonary: no respiratory distress and lungs were clear to auscultation bilaterally.   Heart: heart rate was normal and rhythm regular, normal S1 and S2  Vascular:. there was no peripheral edema  Abdomen: normal bowel sounds, soft, non-tender  Neurological: no focal deficits.   Psychiatric: the affect was normal        LABS:                        10.4   7.22  )-----------( 260      ( 12 Feb 2024 05:30 )             32.9     02-12    138  |  104  |  4<L>  ----------------------------<  86  3.8   |  24  |  0.57    Ca    8.4      12 Feb 2024 05:30  Phos  2.5     02-12  Mg     1.5     02-12    TPro  5.7<L>  /  Alb  2.7<L>  /  TBili  0.3  /  DBili  x   /  AST  20  /  ALT  41  /  AlkPhos  237<H>  02-11      Urinalysis Basic - ( 12 Feb 2024 05:30 )    Color: x / Appearance: x / SG: x / pH: x  Gluc: 86 mg/dL / Ketone: x  / Bili: x / Urobili: x   Blood: x / Protein: x / Nitrite: x   Leuk Esterase: x / RBC: x / WBC x   Sq Epi: x / Non Sq Epi: x / Bacteria: x        MICROBIOLOGY:      RADIOLOGY & ADDITIONAL STUDIES:  Reviewed

## 2024-02-12 NOTE — PROGRESS NOTE ADULT - ASSESSMENT
78 years old female with extensive past medical history recent lap cholecystectomy 1/25, discharged on 2/2 on Augmentin, readmitted with C. Diff. Clinically improving    No acute surgical intervention  Recommend c. Abx treatment of C DIff  F/u ID recs  Recommend holding off PICC and prolonged abx until clarification from ID  Team 1c will continue to follow

## 2024-02-12 NOTE — PROGRESS NOTE ADULT - PROVIDER SPECIALTY LIST ADULT
Infectious Disease
Internal Medicine
Internal Medicine
Infectious Disease
Surgery
Infectious Disease
Hospitalist
Internal Medicine
None

## 2024-02-12 NOTE — DISCHARGE NOTE PROVIDER - NSDCCPCAREPLAN_GEN_ALL_CORE_FT
PRINCIPAL DISCHARGE DIAGNOSIS  Diagnosis: Diarrhea  Assessment and Plan of Treatment: You were found to have C. Diff diarrhea. Inflammation of the colon caused by the bacteria Clostridium difficile. Clostridium difficile colitis results from disruption of normal healthy bacteria in the colon, often from antibiotics. C. difficile can also be transmitted from person to person by spores. It can cause severe damage to the colon and even be fatal. Symptoms include diarrhea, belly pain, and fever.Treatment includes antibiotics. Even when treated with antibiotics, the infection may come back.  Please continue taking VANCOMYCIN 125 mg every 6 HOURS THROUGH 2/19/2024 (while on IV antibiotics), THEN CAN TRANSITION TO EVERY 12 HOURS THROUGH 2/26/2024.      SECONDARY DISCHARGE DIAGNOSES  Diagnosis: Bacteremia  Assessment and Plan of Treatment: Bacteremia is the presence of bacteria in the blood. When bacteria enter the bloodstream, they can cause a life-threatening reaction called sepsis. You were found to have bacterias called Streptococcus Mitis/Oralis and R. Mucilaginosa. Bacteremia is usually treated with IV antibiotic while you were in the hospital and should continue taking CEFTRIAXZONE 1g ONCE DAILY THOUGH 2/19/2024. Do not stop taking the antibiotic even if you start to feel better. Get help right away if you have any new symptoms that develop after treatment has started.  Please follow-up with your PCP Dr. Albright.

## 2024-02-12 NOTE — PROGRESS NOTE ADULT - PROBLEM SELECTOR PLAN 3
BCx from 01/31 positive for several Strept species and R. Mucilaginosa, possible contaminant. Patient was dc'd on Augmentin and was called later on with change in medication to cefpodoxime 200mg BID.   - TTE normal - no signs of IE, NL LVSF

## 2024-02-12 NOTE — DISCHARGE NOTE PROVIDER - CARE PROVIDER_API CALL
DILIP GARCIA, Phys,    Phone: [phone]  Fax: [phone]  Established Patient  Follow Up Time: 2 weeks    Marcelle Briceño  Infectious Disease  [address]  [place] [address]  Phone: [phone]  Fax: [phone]  Follow Up Time: 2 weeks

## 2024-02-12 NOTE — DISCHARGE NOTE PROVIDER - PROVIDER TOKENS
PROVIDER:[TOKEN:[[id number]],FOLLOWUP:[2 weeks],ESTABLISHEDPATIENT:[T]],PROVIDER:[TOKEN:[[id number]],FOLLOWUP:[2 weeks]]

## 2024-02-12 NOTE — DISCHARGE NOTE PROVIDER - CARE PROVIDERS DIRECT ADDRESSES
[email or web address]

## 2024-02-12 NOTE — DISCHARGE NOTE PROVIDER - HOSPITAL COURSE
#Discharge: do not delete    78 year old female w/ recent multiple admissions for cholecystectomy and cholangitis, dc'd on PO abx, later found to have positive BCx who returns now with severe sepsis and diarrhea likely 2/2 C. Diff.      Problem List/Main Diagnoses (system-based):     #Severe sepsis 2/2 c diff  +/- Strep bacteremia   BCx from 01/31 positive for several Strept species and R. Mucilaginosa, possible contaminant. Patient was dc'd on Augmentin and was called later on with change in medication to cefpodoxime 200mg BID.   T 98.4 -> 101.4, HR 99, WBC 31.04   s/p 2L NS Bolus  - BCx NGTD  - 12 days of IV ceftriaxone (2/8 - 2/19)     #Diarrhea 2/2 c diff   C diff toxins indeterminant, GI PCR positive for C. diff  - Cont vanco PO 125mg q6h until 2/19 (total 12 days), followed by PO vanco 125mg q12h prophylaxis regimen from 2/20 - 2/26 to prevent recurrent c.diff.     #S/P cholecystectomy.   ·  Plan: Recent lap cholecystectomy 1/25 after MRCP showing moderate amount of sludge in distended gallbladder, with gallbladder wall thickening and hyperemia and pericholecystic fluid, consistent with acute cholecystitis; hyperenhancement of the liver parenchyma adjacent to the gallbladder fossa is suspicious for gangrenous cholecystitis.; hyperenhancement of the walls of the common hepatic duct and common bile duct, suspicious for cholangitis. Patient was discharge on 2/2 on Augmentin BID and prior to finishing the course transitioned to cefpodoxime. PE: abdomen non TTP, no distention, no guarding and with well healed incision.  - surgery consulted, no interventions  - continue to monitor.    #GERD (gastroesophageal reflux disease).   - c/w home protonix  - consider transitioning to famotidine.    Patient was discharged to: HOME     New medications: CTX and Vancomycin   Changes to old medications: None  Medications that were stopped: None     Items to follow up as outpatient:  - f/u with PCP   - f/u w/ ID Dr. Briceño    Physical exam at the time of discharge:  Baseline: AAOx4, RRR, CTA, warm abdomen that is non TTP, non distended, no rebound nor guarding, laparos copic scars healing well

## 2024-02-12 NOTE — PROGRESS NOTE ADULT - ATTENDING COMMENTS
Include Location In Plan?: No
Detail Level: Generalized
Afebrile, diarrhea resolved, having normal BM.  Tolerating PO vanco and IV CTX without side effects.  Abdomen non-tender.  Please set up OPAT with region care.  Will treat strep bacteremia with 12 days of IV ceftriaxone (2/8 - 2/19).   I do not recommend PO abx switch since it is not a standard of care and there is insufficient data to treat strep bacteremia with PO abx.  Since she will only need 7 more days of abx, please ask [facility] if they can place PIV at home and receive IV ceftriaxone 1g q24h via [facility].  Cont vanco PO 125mg q6h until 2/19 (total 12 days), followed by PO vanco 125mg q12h prophylaxis regimen from 2/20 - 2/26 to prevent recurrent c.diff.  Patient to see me in 2 weeks.    Thank you for your consult.  Please re-consult us or call us with questions.  Case d/w primary team.    Marcelle Briceño MD, MS  Infectious Disease attending  office phone [phone]  For any questions during evening/weekend/holiday, please page ID on call
Detail Level: Simple
Febrile to 100.5 overnight.  Patient feels better, diarrhea less frequent, had 3 overrnight and 2 in the morning.  Whenever she tries to eat or drink, she gets diarrhea but she has good appetite.  Exam notable for non-tender abdomen.  downtrending to 25, Cr 0.7, BCx 2/8 ngtd.  Overall c.diff improving.  Discussed with patient about the rationale of treating strep bacteremia as well as the cause of c.diff diarrhea.  Explained to her about OPAT in detail and patient in agreement.  Cont vanco 125mg PO q6h.  Cont ceftriaxone 1g IV q24h - plan to treat for 12 days (counting 2 days of zosyn during last admission, to make total 14 days).  f/u TTE.  Plan for midline placement prior to discharge.    Team 1 will follow you.  Dr. Andersen is covering me this weekend. I will return on Monday.  Case d/w primary team.    Marcelle Briceño MD, MS  Infectious Disease attending  office phone [phone]  For any questions during evening/weekend/holiday, please page ID on call
78F with PMH of HCV (treated 20 years ago), GERD, diverticulitis, recent admission for cholangitis and cholecystectomy presenting with severe diarrha and bacteremia.  Noted to have streptococcal bacteremia and c diff colitis.  Now on ceftriaxone and oral vancomycin and admitted for further workup.  Infectious disease following.       Physical exam  general: no acute distress, sitting up in bed  heent: ncat, mmm  cards: rrr, normal S1S2, no mrg  pulm: ctab, no wheeze, crackles, rales or rhonchi  ab: soft, nontender, nondistended, normoactive bowel sounds  ext: wwp, no edema  neuro: speech is fluent, no facial asymmetry, follows commands, no acute deficits noted  skin: warm and dry, no obvious rashes or lesions present    #C diff colitis  #Streptococcal bacteremia  - continue with oral vancomycin and ceftriaxone.  Pending results from repeat blood cultures  - TTE  - monitor stool output  - clear liquid diet.  Patient reports that if she tries to eat any solid food, she immediately has to defecate.    - leukocytosis improving  - monitor LFTs    35 minutes spent on this encounter, including face to face with patient, care coordination and documentation.
78F with PMH of HCV (treated 20 years ago), GERD, diverticulitis, recent admission for cholangitis and cholecystectomy presenting with severe diarrha and bacteremia.  Noted to have streptococcal bacteremia and c diff colitis.  Now on ceftriaxone and oral vancomycin and admitted for further workup.  Infectious disease following.       Physical exam  general: no acute distress, sitting up in bed  heent: ncat, mmm  cards: rrr, normal S1S2, no mrg  pulm: ctab, no wheeze, crackles, rales or rhonchi  ab: soft, nontender, nondistended, normoactive bowel sounds  ext: wwp, no edema  neuro: speech is fluent, no facial asymmetry, follows commands, no acute deficits noted  skin: warm and dry, no obvious rashes or lesions present    #C diff colitis  #Streptococcal bacteremia  - continue with oral vancomycin and ceftriaxone.  Pending results from repeat blood cultures  - TTE pending  - fewer stools today  - leukocytosis improving      35 minutes spent on this encounter, including face to face with patient, care coordination and documentation.

## 2024-02-12 NOTE — DISCHARGE NOTE PROVIDER - NSDCMRMEDTOKEN_GEN_ALL_CORE_FT
amoxicillin-clavulanate 875 mg-125 mg oral tablet: 875 milligram(s) orally 2 times a day MDD: 2 tabs  cefpodoxime 200 mg oral tablet: 1 tab(s) orally 2 times a day  cefTRIAXone 1 g/50 mL-iso-osmotic dextrose intravenous solution: 1 gram(s) intravenously once a day 1G q24hrs THROUGH 2/19.  pantoprazole 40 mg oral delayed release tablet: 1 tab(s) orally once a day   cefTRIAXone 1 g/50 mL-iso-osmotic dextrose intravenous solution: 1 gram(s) intravenously once a day 1G q24hrs THROUGH 2/19.  Multiple Vitamins with Minerals oral tablet: 1 tab(s) orally once a day  pantoprazole 40 mg oral delayed release tablet: 1 tab(s) orally once a day  vancomycin 125 mg oral capsule: 1 cap(s) orally every 6 hours PLEASE TAKE EVERY 6 HOURS THROUGH 2/19, THEN EVERY 12 HOURSE THROUGH 2/26

## 2024-02-12 NOTE — PROGRESS NOTE ADULT - PROBLEM SELECTOR PLAN 1
BCx from 01/31 positive for several Strept species and R. Mucilaginosa, possible contaminant. Patient was dc'd on Augmentin and was called later on with change in medication to cefpodoxime 200mg BID.   T 98.4 -> 101.4, HR 99, WBC 31.04   s/p 2L NS Bolus  - BCx NGTD  - 12 days of IV ceftriaxone (2/8 - 2/19)

## 2024-02-12 NOTE — DISCHARGE NOTE NURSING/CASE MANAGEMENT/SOCIAL WORK - PATIENT PORTAL LINK FT
You can access the FollowMyHealth Patient Portal offered by [facility] by registering at the following website: [email or web address]. By joining [facility]’s FollowMyHealth portal, you will also be able to view your health information using other applications (apps) compatible with our system.

## 2024-02-12 NOTE — PROGRESS NOTE ADULT - NUTRITIONAL ASSESSMENT
This patient has been assessed with a concern for Malnutrition and has been determined to have a diagnosis/diagnoses of Severe protein-calorie malnutrition and Underweight (BMI < 19).    This patient is being managed with:   Diet Easy to Chew-  Entered: Feb 9 2024  3:25PM  

## 2024-02-12 NOTE — PROGRESS NOTE ADULT - PROBLEM SELECTOR PLAN 2
#r/o c diff   Patient with more than 25 episodes of diarrhea since the night prior to admission. Denies bloody bowel movement. Denies abdominal pain. Reports fevers and chills.   C diff toxins indeterminant, GI PCR positive for C. diff  - D/c on vancomycin 125 mg PO q6h through 2/19 for a total of 12 days  - Then planned to transition to vancomycin 125mg PO q12h (prophylaxis regimen, 2/20 - 2/26)

## 2024-02-12 NOTE — DISCHARGE NOTE PROVIDER - ATTENDING DISCHARGE PHYSICAL EXAMINATION:
Pt is 77 yo woman admitted with sepsis due to C. Diff in the setting of cefpodoxime therapy for Strep mitis/oralis/salivaris bacteremia. Pt is planned for discharge home today to complete bacteremia therapy with IV Ceftriaxone through Feb 19th and to cont with PO Vanc course for first time C. diff colitis to overlap it with a short taper protocol.   Pt is accepted by infusion company which will be able to use peripheral line at home for the duration of 7 day antibiotic course.   Planned for home discharge today.

## 2024-02-12 NOTE — PROGRESS NOTE ADULT - ASSESSMENT
78F h/o cholelithiasis s/p laparoscopic cholecystectomy on 1/25/24 c/b cholangitis with high-grade bacteremia with Strep mitis/oralis and Rothia d/luisa on po antibiotics p/w acute onset of diarrhea x 1 day, found to have C. diff diarrhea. She resumed IV therapy for bacteremia.  She is markedly clinically improved, afebrile, WBC normalized     Recommendations:  - Patient currently on CTX 1g qd for Strept bacteremia. On discharge patient will continue with this dosing via peripheral IV if approved.  - Duration of antibiotics is 12 days ( 02/08 - 02/19 )  - Weekly labs: CMP, CBC w/ diff, faxed to ID office at [phone]  - Patient to follow up with Dr. Briceño in 2 weeks ([address] [place] [address], [phone]), ID office will call patient to schedule   - Patient also on PO Van 125mg q6h for C. Diff. On discharge patient should continue with this dosing until 2/19. Then transition to 125mg BID until 2/26

## 2024-02-12 NOTE — DISCHARGE NOTE PROVIDER - NSDCCPTREATMENT_GEN_ALL_CORE_FT
PRINCIPAL PROCEDURE  Procedure: Transthoracic echocardiography (TTE)  Findings and Treatment: Left Ventricle:  There is a septal "knuckle" with no evidence of left ventriclular outflow   obstruction. The left ventricle is normal in size, wall thickness, and   systolic function with a calculated ejection fraction of 55-60%. There   are no regional wall motion abnormalities seen. There is normal left   ventricular diastolic function and filling pressure.  Right Ventricle:  The right ventricle is normal in size. Right ventricular systolic   function is normal. The tricuspid annular plane systolic excursion   (TAPSE) is 27.70 mm (normal >=17 mm).  Left Atrium:  The left atrium is normal in size.  Right Atrium:  The right atrium is normal in size.  Aortic Valve:  The aortic valve is tricuspid with normal structure and function without   stenosis. There is no evidence of aortic regurgitation.  Mitral Valve:  Structurally normal mitral valve with normal leaflet excursion. There is   trace mitral regurgitation.  Tricuspid Valve:  Structurally normal tricuspid valve with normal leaflet excursion. There   is trace tricuspid regurgitation. There was insufficient tricuspid   regurgitation detected from which to calculate pulmonary artery systolic   pressure.  Inferior Vena Cava:  The inferior vena cava is normal in size (<2.1cm) with normal inspiratory   collapse (>50%) consistent with normal right atrial pressure (  3, range 0-5mmHg).  Pulmonic Valve:  Structurally normal pulmonic valve with normal leaflet excursion. There   is trace pulmonic regurgitation.  Aorta:  The aortic root is normal in size. The aortic arch is normal without   evidence of aortic coarctation. The aortic root measures 2.80 cm at level   of the sinuses of Valsalva (normal 3.1-3.7 cm for men, 2.7-3.3 cm for   women).  Pericardium:  No pericardial effusion is seen.        SECONDARY PROCEDURE  Procedure: Blood culture  Findings and Treatment: Growth in aerobic bottle: Gram positive cocci in pairs   Growth in anaerobic bottle: Gram positive cocci in pairs  - Streptococcus sp. (Not Grp A, B or S pneumoniae): Detec  Specimen Source: .Blood Blood  Organism: Blood Culture PCR  Culture Results:   Growth in aerobic and anaerobic bottles: Streptococcus mitis/oralis group   See previous culture [id number]   Growth in anaerobic bottle: Streptococcus salivarius/vestibularis group   "Susceptibilities not performed"   Growth in aerobic bottle: Rothia mucilaginosa   "Susceptibilities not performed"

## 2024-02-12 NOTE — PROGRESS NOTE ADULT - PROBLEM SELECTOR PLAN 5
- c/w home protonix  - consider transitioning to famotidine
No complaints

## 2024-02-12 NOTE — PROGRESS NOTE ADULT - SUBJECTIVE AND OBJECTIVE BOX
INTERVAL HPI/OVERNIGHT EVENTS: WBC 2/11 9.73(13.52), BCx no growth     SUBJECTIVE: Evaluated at the bedside. States pain and diarrhea much improved. Denies nausea, emesis, cp, sob    cefTRIAXone   IVPB 1000 milliGRAM(s) IV Intermittent every 24 hours  enoxaparin Injectable 30 milliGRAM(s) SubCutaneous every 24 hours  vancomycin    Solution 125 milliGRAM(s) Oral every 6 hours      Vital Signs Last 24 Hrs  T(C): 36.8 (12 Feb 2024 06:02), Max: 36.8 (12 Feb 2024 06:02)  T(F): 98.3 (12 Feb 2024 06:02), Max: 98.3 (12 Feb 2024 06:02)  HR: 81 (12 Feb 2024 06:02) (81 - 88)  BP: 121/67 (12 Feb 2024 06:02) (121/67 - 137/84)  BP(mean): 85 (12 Feb 2024 06:02) (85 - 85)  RR: 17 (12 Feb 2024 06:02) (17 - 17)  SpO2: 97% (12 Feb 2024 06:02) (94% - 97%)    Parameters below as of 12 Feb 2024 06:02  Patient On (Oxygen Delivery Method): room air      I&O's Detail      Physical Exam  General: NAD, resting comfortably in bed  C/V: NSR  Pulm: Nonlabored breathing, no respiratory distress  Abd: soft, non-tender, non-distended.  Extrem: WWP, no edema,    LABS:                        11.4   9.73  )-----------( 268      ( 11 Feb 2024 05:30 )             34.9     02-11    141  |  106  |  3<L>  ----------------------------<  99  3.6   |  27  |  0.62    Ca    8.4      11 Feb 2024 05:30  Phos  3.0     02-11  Mg     1.8     02-11    TPro  5.7<L>  /  Alb  2.7<L>  /  TBili  0.3  /  DBili  x   /  AST  20  /  ALT  41  /  AlkPhos  237<H>  02-11      Urinalysis Basic - ( 11 Feb 2024 05:30 )    Color: x / Appearance: x / SG: x / pH: x  Gluc: 99 mg/dL / Ketone: x  / Bili: x / Urobili: x   Blood: x / Protein: x / Nitrite: x   Leuk Esterase: x / RBC: x / WBC x   Sq Epi: x / Non Sq Epi: x / Bacteria: x        RADIOLOGY & ADDITIONAL STUDIES:

## 2024-02-13 ENCOUNTER — APPOINTMENT (OUTPATIENT)
Dept: SURGICAL ONCOLOGY | Facility: CLINIC | Age: 79
End: 2024-02-13

## 2024-02-13 ENCOUNTER — NON-APPOINTMENT (OUTPATIENT)
Age: 79
End: 2024-02-13

## 2024-02-16 ENCOUNTER — NON-APPOINTMENT (OUTPATIENT)
Age: 79
End: 2024-02-16

## 2024-02-21 DIAGNOSIS — Z91.013 ALLERGY TO SEAFOOD: ICD-10-CM

## 2024-02-21 DIAGNOSIS — E83.42 HYPOMAGNESEMIA: ICD-10-CM

## 2024-02-21 DIAGNOSIS — E87.20 ACIDOSIS, UNSPECIFIED: ICD-10-CM

## 2024-02-21 DIAGNOSIS — R19.7 DIARRHEA, UNSPECIFIED: ICD-10-CM

## 2024-02-21 DIAGNOSIS — R65.20 SEVERE SEPSIS WITHOUT SEPTIC SHOCK: ICD-10-CM

## 2024-02-21 DIAGNOSIS — Z98.890 OTHER SPECIFIED POSTPROCEDURAL STATES: ICD-10-CM

## 2024-02-21 DIAGNOSIS — E43 UNSPECIFIED SEVERE PROTEIN-CALORIE MALNUTRITION: ICD-10-CM

## 2024-02-21 DIAGNOSIS — A40.9 STREPTOCOCCAL SEPSIS, UNSPECIFIED: ICD-10-CM

## 2024-02-21 DIAGNOSIS — A04.72 ENTEROCOLITIS DUE TO CLOSTRIDIUM DIFFICILE, NOT SPECIFIED AS RECURRENT: ICD-10-CM

## 2024-02-21 DIAGNOSIS — K21.9 GASTRO-ESOPHAGEAL REFLUX DISEASE WITHOUT ESOPHAGITIS: ICD-10-CM

## 2024-02-23 ENCOUNTER — APPOINTMENT (OUTPATIENT)
Dept: INFECTIOUS DISEASE | Facility: CLINIC | Age: 79
End: 2024-02-23
Payer: MEDICARE

## 2024-02-23 VITALS
TEMPERATURE: 98.3 F | SYSTOLIC BLOOD PRESSURE: 141 MMHG | HEART RATE: 78 BPM | DIASTOLIC BLOOD PRESSURE: 85 MMHG | OXYGEN SATURATION: 96 %

## 2024-02-23 DIAGNOSIS — A04.72 ENTEROCOLITIS DUE TO CLOSTRIDIUM DIFFICILE, NOT SPECIFIED AS RECURRENT: ICD-10-CM

## 2024-02-23 DIAGNOSIS — B95.5 BACTEREMIA: ICD-10-CM

## 2024-02-23 DIAGNOSIS — R78.81 BACTEREMIA: ICD-10-CM

## 2024-02-23 PROCEDURE — 99214 OFFICE O/P EST MOD 30 MIN: CPT

## 2024-02-23 NOTE — ASSESSMENT
[FreeTextEntry1] : 78F h/o cholelithiasis s/p laparoscopic cholecystectomy on 1/25/24 c/b cholangitis and polymicrobial bacteremia s/p IV ceftriaxone (2/8-2/20/24) p/w management of bacteremia and C.diff diarrhea.  Bacteremia is now appropriately treated and she doesn't have clinical signs c/f recurrent bacteremia.  Her diarrhea mostly resolved, however still has loose stool 2-3 times/day (which is significantly better than prior, 20 watery diarrhea/day).  She is now off ceftriaxone and on prophylaxis vanco regimen.  Her BM is expected to improve over time.  If she gets recurrent diarrhea after finishing PO vanco, then recurrent c.diff should be suspected and she needs to be treated again.  I discussed about this and patient will notify me should she has recurrent diarrhea.  Last lab 2/15 showed mild leukocytosis and mild transaminitis - will repeat labs today.    - Cont PO vanco 125mg q12h prophylaxis regimen from 2/21 - 2/28 to prevent recurrent c.diff - If she needs abx in the next 3 months, she should get PO vanco 125mg q12h ppx regimen for the duration of abx + 7 additional days  - labs today - f/u Dr. Magallon on 2/26

## 2024-02-23 NOTE — PHYSICAL EXAM
[General Appearance - Alert] : alert [General Appearance - In No Acute Distress] : in no acute distress [Sclera] : the sclera and conjunctiva were normal [Outer Ear] : the ears and nose were normal in appearance [Neck Appearance] : the appearance of the neck was normal [] : no respiratory distress [Auscultation Breath Sounds / Voice Sounds] : lungs were clear to auscultation bilaterally [Heart Rate And Rhythm] : heart rate was normal and rhythm regular [Bowel Sounds] : normal bowel sounds [Heart Sounds] : normal S1 and S2 [Abdomen Soft] : soft [Abdomen Tenderness] : non-tender

## 2024-02-23 NOTE — HISTORY OF PRESENT ILLNESS
[FreeTextEntry1] : 78F h/o cholelithiasis s/p laparoscopic cholecystectomy on 1/25/24 c/b cholangitis and polymicrobial bacteremia s/p IV ceftriaxone (2/8-2/20/24) p/w management of bacteremia and C.diff diarrhea.  Patient was initially admitted from 1/18-1/19/24 for abdominal pain, found to have cholelithiasis. She was discharged the next day and was admitted from 1/25-1/26 for elective cholecystectomy without complication. She was then readmitted from 1/31-2/2 for worsening RUQ pain, found to have cholangitis. She was afebrile but WBC 11, BCx grew Strep mitis oralis (4 bottles), strep salivalis (1 bottle), Rothia mucilaginosa (2 bottles). LFT downtrended with IV zosyn, and she was sent home with Augmentin. After discharge, abx was switched to Cefpodoxime. Her abdominal pain resolved, however she started to have explosive diarrhea yesterday, 20 times/day. Denied fever/chills, n/v/abdominal pain. Thus she came to the hospital.  She was re-admitted from 2/8-2/12/24, was found to have C.diff diarrhea.  Upon admission, afebrile, VSS, well appearing female, abdomen soft, NTND. WBC 31, Cr 1.03, C.diff toxin neg, GDH+, PCR+, GI PCR neg. Patient here with C.diff diarrhea.  She was started on vancomycin 125mg PO q6h for c.diff, and was started on IV ceftriaxone 1g IV q24h for treatment of strep bacteremia.  TTE unremarkable.  BCx neg on 2/8.  WBC normalized and diarrhea resolved.  She was sent home with IV ceftriaxone for 7 more days with PO vancomycin for c.diff diarrhea.  Since discharge, [facility] was unable to place PIV on 2/13 so she missed a dose of CTX.  She was restarted on IV Ceftriaxone on 2/14 for 7 days, and finished it on 2/20.  She is currently on PO vanco q12h ppx.  Today patient reports feeling better but still recovering.  She gets loose stool (semi-formed) 2-3 times/day.  Denied fever/chills, n/v/abdominal pain.  She has good appetite and eating well.  She has appointment with Dr. Magallon on 2/26.

## 2024-02-23 NOTE — DATA REVIEWED
[FreeTextEntry1] : 2/15/24 lab notable for Cr 0.73 AST/ALT 44/54 11.95 > 13.1 < 501  ## MICR# ## 2/8/24 BCx neg x 2 2/8/24 GI PCR neg 2/8/24 C.diff positive 1/31/24 BCx Strep mitis oralis (S CTX, I to penicillin), Strep salivaris vestibularis, Rothia mucilaginosa 1/31/24 BCx Strep mitis oralis, Rothia mucilaginosa  1/31 UCx neg  ## IMAGING ##  TTE 2/12/24  1. Normal left and right ventricular size and systolic function.  2. Normal atria.  3. No significant valvular disease.  4. No evidence of pulmonary hypertension.  5. No pericardial effusion.  6. No echocardiographic signs of endocarditis.  7. No prior echo is available for comparison.  2/1/24 MRCP  IMPRESSION: 1. Status post cholecystectomy. Mild enhancement of the common duct and  central intrahepatic biliary ducts suggestive of cholangitis. No  choledocholithiasis. Abrupt change in caliber at the common bile duct may  represent stenosis of the distal common bile duct. Consider endoscopic  evaluation.  1/31//24 CT a/p IMPRESSION: Status post interval cholecystectomy as compared to CT 1/18/2024. Unchanged dilated common bile duct with wall thickening which could  represent cholangitis. No evidence of colitis or colonic diverticulitis.

## 2024-02-26 ENCOUNTER — APPOINTMENT (OUTPATIENT)
Dept: SURGICAL ONCOLOGY | Facility: CLINIC | Age: 79
End: 2024-02-26
Payer: MEDICARE

## 2024-02-26 VITALS
OXYGEN SATURATION: 96 % | RESPIRATION RATE: 16 BRPM | TEMPERATURE: 97.9 F | WEIGHT: 118 LBS | BODY MASS INDEX: 19.66 KG/M2 | HEART RATE: 80 BPM | SYSTOLIC BLOOD PRESSURE: 128 MMHG | HEIGHT: 65 IN | DIASTOLIC BLOOD PRESSURE: 78 MMHG

## 2024-02-26 DIAGNOSIS — Z90.49 ACQUIRED ABSENCE OF OTHER SPECIFIED PARTS OF DIGESTIVE TRACT: ICD-10-CM

## 2024-02-26 LAB
ALBUMIN SERPL ELPH-MCNC: 4.3 G/DL
ALP BLD-CCNC: 175 U/L
ALT SERPL-CCNC: 35 U/L
ANION GAP SERPL CALC-SCNC: 14 MMOL/L
AST SERPL-CCNC: 29 U/L
BASOPHILS # BLD AUTO: 0.1 K/UL
BASOPHILS NFR BLD AUTO: 1.2 %
BILIRUB SERPL-MCNC: 0.4 MG/DL
BUN SERPL-MCNC: 16 MG/DL
CALCIUM SERPL-MCNC: 9 MG/DL
CHLORIDE SERPL-SCNC: 101 MMOL/L
CO2 SERPL-SCNC: 24 MMOL/L
CREAT SERPL-MCNC: 0.73 MG/DL
EGFR: 85 ML/MIN/1.73M2
EOSINOPHIL # BLD AUTO: 0.53 K/UL
EOSINOPHIL NFR BLD AUTO: 6.3 %
GLUCOSE SERPL-MCNC: 86 MG/DL
HCT VFR BLD CALC: 39.5 %
HGB BLD-MCNC: 12.3 G/DL
IMM GRANULOCYTES NFR BLD AUTO: 0.2 %
LYMPHOCYTES # BLD AUTO: 1.87 K/UL
LYMPHOCYTES NFR BLD AUTO: 22.4 %
MAN DIFF?: NORMAL
MCHC RBC-ENTMCNC: 29.9 PG
MCHC RBC-ENTMCNC: 31.1 GM/DL
MCV RBC AUTO: 96.1 FL
MONOCYTES # BLD AUTO: 0.67 K/UL
MONOCYTES NFR BLD AUTO: 8 %
NEUTROPHILS # BLD AUTO: 5.17 K/UL
NEUTROPHILS NFR BLD AUTO: 61.9 %
PLATELET # BLD AUTO: 307 K/UL
POTASSIUM SERPL-SCNC: 4.9 MMOL/L
PROT SERPL-MCNC: 6.9 G/DL
RBC # BLD: 4.11 M/UL
RBC # FLD: 14.4 %
SODIUM SERPL-SCNC: 139 MMOL/L
WBC # FLD AUTO: 8.36 K/UL

## 2024-02-26 PROCEDURE — 99024 POSTOP FOLLOW-UP VISIT: CPT

## 2024-02-26 NOTE — PHYSICAL EXAM
[Normal] : oriented to person, place and time, with appropriate affect [de-identified] : Soft, no peritoneal signs

## 2024-02-26 NOTE — HISTORY OF PRESENT ILLNESS
[FreeTextEntry1] : Mely Garcia MRN: [id number] Date of visit: 2/26/24 Diagnosis: Acute Cholecystitis  Procedure: Laparoscopic Cholecystectomy  Operative date: 01/25/2024  Pathology: chronic cholecystitis  78-year-old female with past medical history of GERD and diverticulosis with recent diverticulitis within last year treated with antibiotics (per chart review also PSVT, hepatitis C), past surgical history of hip surgery (2 years ago s/p mechanical fall) presented to [facility] ED on 1/18 with 3-day history of worsening epigastric pain.    Work up:  1/18/24- CTAP: No evidence of colonic diverticulitis or colitis. Normal appendix. Dilated up at least 1.3 cm thick-walled CBD. Finding can be correlated with MR/MRCP and abdominal US.  1/18/24- RUQ US demonstrates dilated CBD to 11.5 mm with mild smooth wall thickening up to 4mm, no pericholecystic fluid. Biliary sludge. No shadowing gallstones or sonographic evidence of choledocholithiasis. No intrahepatic duct dilatation.  1/18/24- Labs: T bili 1.6cm, Alk Phos 295, AST//497.  1/19/24- Labs: T bili 1.0, ALk Phos 225, AST//269.  1/20/24- MR MRCP: 1. No choledocholithiasis; sludge in distended gallbladder, with gallbladder wall thickening and hyperemia and pericholecystic fluid, consistent with acute cholecystitis. Hyperenhancement of the liver parenchyma adjacent to the gallbladder fossa is suspicious for gangrenous cholecystitis.  Hyperenhancement of the walls of the common hepatic duct and common bile duct, suspicious for cholangitis.  Low insertion of cystic duct on common hepatic duct, 2.3 cm proximal to the major papilla.   On 1/25/24, patient underwent laparoscopic cholecystectomy.  She is here today for first postoperative visit.  Her postop course was complicated by readmission to the hospital for abdominal pain with abnormal liver function test.  Repeat MRI did not show any evidence of common duct stones but there was evidence of cholangitis which was treated with IV antibiotics.  Unfortunately subsequent to that she developed C. difficile which was then treated with p.o. Vanco.  She follows with infectious disease saw them recently lab data shows a white count is normal and her liver functions are improving.  Overall she feels well now and is almost back to baseline.  Patient is currently being treated for C.Diff infection, was evaluated by ID who prescribed PO vanco, she has two days left. appetite changes, abdominal pain, nausea, vomiting, unintentional weight loss, fevers or chills.

## 2024-02-26 NOTE — ASSESSMENT
[FreeTextEntry1] : Impression) status post lap leon  Plan) reviewed clinical course with the patient as well as her pathology from the cholecystectomy which showed benign disease.  She is finishing her antibiotics in the next couple of days.  Her most recent liver functions show that her alkaline phosphatase is down to 175 from peak of over 300 everything else seems to be normal.  Will repeat her LFTs in 1 month and assuming all is okay we will plan on a follow-up MRI in early May.  If anything changes between now and then she knows to contact us.  All questions answered.  Danielito Magallon MD  Chief Surgical Oncology Multidisciplinary GI cancer program [facility]  Professor Surgery [facility] [employer]   CC Dr. Leidy Montemayor

## 2024-02-26 NOTE — ASSESSMENT
[FreeTextEntry1] : Impression) status post lap leon  Plan) reviewed clinical course with the patient as well as her pathology from the cholecystectomy which showed benign disease.  She is finishing her antibiotics in the next couple of days.  Her most recent liver functions show that her alkaline phosphatase is down to 175 from peak of over 300 everything else seems to be normal.  Will repeat her LFTs in 1 month and assuming all is okay we will plan on a follow-up MRI in early May.  If anything changes between now and then she knows to contact us.  All questions answered.  Danielito Magallon MD  Chief Surgical Oncology Multidisciplinary GI cancer program [facility]  Professor Surgery [facility] [employer]   CC Dr. Leidy Montemayor

## 2024-02-26 NOTE — PHYSICAL EXAM
[Normal] : oriented to person, place and time, with appropriate affect [de-identified] : Soft, no peritoneal signs

## 2024-02-26 NOTE — HISTORY OF PRESENT ILLNESS
[FreeTextEntry1] : Mely Garcia MRN: [id number] Date of visit: 2/26/24 Diagnosis: Acute Cholecystitis  Procedure: Laparoscopic Cholecystectomy  Operative date: 01/25/2024  Pathology: chronic cholecystitis  78-year-old female with past medical history of GERD and diverticulosis with recent diverticulitis within last year treated with antibiotics (per chart review also PSVT, hepatitis C), past surgical history of hip surgery (2 years ago s/p mechanical fall) presented to [facility] ED on 1/18 with 3-day history of worsening epigastric pain.    Work up:  1/18/24- CTAP: No evidence of colonic diverticulitis or colitis. Normal appendix. Dilated up at least 1.3 cm thick-walled CBD. Finding can be correlated with MR/MRCP and abdominal US.  1/18/24- RUQ US demonstrates dilated CBD to 11.5 mm with mild smooth wall thickening up to 4mm, no pericholecystic fluid. Biliary sludge. No shadowing gallstones or sonographic evidence of choledocholithiasis. No intrahepatic duct dilatation.  1/18/24- Labs: T bili 1.6cm, Alk Phos 295, AST//497.  1/19/24- Labs: T bili 1.0, ALk Phos 225, AST//269.  1/20/24- MR MRCP: 1. No choledocholithiasis; sludge in distended gallbladder, with gallbladder wall thickening and hyperemia and pericholecystic fluid, consistent with acute cholecystitis. Hyperenhancement of the liver parenchyma adjacent to the gallbladder fossa is suspicious for gangrenous cholecystitis.  Hyperenhancement of the walls of the common hepatic duct and common bile duct, suspicious for cholangitis.  Low insertion of cystic duct on common hepatic duct, 2.3 cm proximal to the major papilla.   On 1/25/24, patient underwent laparoscopic cholecystectomy.  She is here today for first postoperative visit.  Her postop course was complicated by readmission to the hospital for abdominal pain with abnormal liver function test.  Repeat MRI did not show any evidence of common duct stones but there was evidence of cholangitis which was treated with IV antibiotics.  Unfortunately subsequent to that she developed C. difficile which was then treated with p.o. Vanco.  She follows with infectious disease saw them recently lab data shows a white count is normal and her liver functions are improving.  Overall she feels well now and is almost back to baseline.  Patient is currently being treated for C.Diff infection, was evaluated by ID who prescribed PO vanco, she has two days left. appetite changes, abdominal pain, nausea, vomiting, unintentional weight loss, fevers or chills.

## 2024-03-21 LAB
ALBUMIN SERPL ELPH-MCNC: 4.6 G/DL
ALP BLD-CCNC: 368 U/L
ALT SERPL-CCNC: 170 U/L
AST SERPL-CCNC: 251 U/L
BILIRUB DIRECT SERPL-MCNC: 0.4 MG/DL
BILIRUB INDIRECT SERPL-MCNC: 0.4 MG/DL
BILIRUB SERPL-MCNC: 0.8 MG/DL
PROT SERPL-MCNC: 7.1 G/DL

## 2024-03-26 ENCOUNTER — NON-APPOINTMENT (OUTPATIENT)
Age: 79
End: 2024-03-26

## 2024-03-28 ENCOUNTER — RESULT REVIEW (OUTPATIENT)
Age: 79
End: 2024-03-28

## 2024-03-28 ENCOUNTER — OUTPATIENT (OUTPATIENT)
Dept: OUTPATIENT SERVICES | Facility: HOSPITAL | Age: 79
LOS: 1 days | End: 2024-03-28

## 2024-03-28 ENCOUNTER — APPOINTMENT (OUTPATIENT)
Dept: MRI IMAGING | Facility: CLINIC | Age: 79
End: 2024-03-28
Payer: MEDICARE

## 2024-03-28 PROCEDURE — 74183 MRI ABD W/O CNTR FLWD CNTR: CPT | Mod: 26,MA

## 2024-04-02 ENCOUNTER — APPOINTMENT (OUTPATIENT)
Dept: SURGICAL ONCOLOGY | Facility: CLINIC | Age: 79
End: 2024-04-02
Payer: MEDICARE

## 2024-04-02 DIAGNOSIS — R79.89 OTHER SPECIFIED ABNORMAL FINDINGS OF BLOOD CHEMISTRY: ICD-10-CM

## 2024-04-02 PROCEDURE — 99213 OFFICE O/P EST LOW 20 MIN: CPT | Mod: 24

## 2024-04-02 NOTE — REASON FOR VISIT
[Follow-Up Visit] : a follow-up visit for [Home] : at home, [unfilled] , at the time of the visit. [Medical Office: ([facility])___] : at the medical office located in  [Patient] : the patient [FreeTextEntry2] : Cholecystitis, MRCP report review

## 2024-04-02 NOTE — ASSESSMENT
[FreeTextEntry1] : Impression) elevated LFTs  Plan) reviewed current clinical findings with the patient and her .  The liver function tests are somewhat elevated however the MRI/MRCP does not show any evidence of stones in the common duct or any clear stricture.  The duct is somewhat dilated but she is also status post a laparoscopic cholecystectomy.  Will review all her data at the upcoming [facility]/GI conference and discussed with the group whether there is a role for any intervention at this time or we should continue to monitor her liver tests.  Clinically she is doing fine and has no complaints.  All questions answered.  Danielito Magallon MD  Chief Surgical Oncology Multidisciplinary GI cancer program [facility]  Professor Surgery [facility] [employer]   CC Dr. Leidy Montemayor  Time spent on telehealth 20 minutes

## 2024-04-02 NOTE — HISTORY OF PRESENT ILLNESS
[de-identified] : Mely Garcia MRN: [id number] Date of visit: 2/26/24 Diagnosis: Acute Cholecystitis  Procedure: Laparoscopic Cholecystectomy Operative date: 01/25/2024 Pathology: chronic cholecystitis  78-year-old female with past medical history of GERD and diverticulosis with recent diverticulitis within last year treated with antibiotics (per chart review also PSVT, hepatitis C), past surgical history of hip surgery (2 years ago s/p mechanical fall) s/p  laparoscopic cholecystectomy course complicated by cholangitis and polymicrobial bacteremia and c.diff diarrhea.  Patient is on televisit for to review her LFTs and MRCP report.   Patient denies changes in bowel habits, appetite changes, abdominal pain, nausea, vomiting, unintentional weight loss, fevers or chills.  Work up: 1/18/24- CTAP: No evidence of colonic diverticulitis or colitis. Normal appendix. Dilated up at least 1.3 cm thick-walled CBD. Finding can be correlated with MR/MRCP and abdominal US. 1/18/24- RUQ US demonstrates dilated CBD to 11.5 mm with mild smooth wall thickening up to 4mm, no pericholecystic fluid. Biliary sludge. No shadowing gallstones or sonographic evidence of choledocholithiasis. No intrahepatic duct dilatation. 1/18/24- Labs: T bili 1.6cm, Alk Phos 295, AST//497. 1/19/24- Labs: T bili 1.0, ALk Phos 225, AST//269. 1/20/24- MR MRCP: 1. No choledocholithiasis; sludge in distended gallbladder, with gallbladder wall thickening and hyperemia and pericholecystic fluid, consistent with acute cholecystitis. Hyperenhancement of the liver parenchyma adjacent to the gallbladder fossa is suspicious for gangrenous cholecystitis. Hyperenhancement of the walls of the common hepatic duct and common bile duct, suspicious for cholangitis. Low insertion of cystic duct on common hepatic duct, 2.3 cm proximal to the major papilla.  1/25/24 - lap leon   1/31/24-2/2/24 : readmission for cholangitis and strep mitis oralis, strep salivalis and rothis mucilaginosa bacteremia s/p abx. MRI did not show any evidence of common duct stones but there was evidence of cholangitis which was treated with IV antibiotics.   2/8/24-2/12/24: readmission for C.diff diarrhea  3/21/24 - , , Alk Phos 368, Tbili 0.8  3/28/24 - MRCP: Status post cholecystectomy. No choledocholithiasis. Common bile duct is dilated up to 1.4 cm. No significant interval change.

## 2024-04-15 ENCOUNTER — APPOINTMENT (OUTPATIENT)
Age: 79
End: 2024-04-15

## 2024-04-16 ENCOUNTER — APPOINTMENT (OUTPATIENT)
Age: 79
End: 2024-04-16
Payer: COMMERCIAL

## 2024-04-16 PROCEDURE — 99443: CPT | Mod: 95

## 2024-05-01 ENCOUNTER — OUTPATIENT (OUTPATIENT)
Dept: OUTPATIENT SERVICES | Facility: HOSPITAL | Age: 79
LOS: 1 days | Discharge: HOME | End: 2024-05-01
Payer: MEDICARE

## 2024-05-01 ENCOUNTER — APPOINTMENT (OUTPATIENT)
Age: 79
End: 2024-05-01

## 2024-05-01 PROCEDURE — 43259 EGD US EXAM DUODENUM/JEJUNUM: CPT

## 2024-06-22 NOTE — ED ADULT NURSE NOTE - PRO INTERPRETER NEED 2
- Known prior hx of DVT and plenic vein thrombosis. He is no longer on AC.   - Hold DVT ppx until after GI procedures, plan to start after interventions   English

## 2024-07-13 ENCOUNTER — APPOINTMENT (OUTPATIENT)
Dept: CT IMAGING | Facility: CLINIC | Age: 79
End: 2024-07-13
Payer: MEDICARE

## 2024-07-13 ENCOUNTER — OUTPATIENT (OUTPATIENT)
Dept: OUTPATIENT SERVICES | Facility: HOSPITAL | Age: 79
LOS: 1 days | End: 2024-07-13

## 2024-07-13 PROCEDURE — 74177 CT ABD & PELVIS W/CONTRAST: CPT | Mod: 26,MH

## 2024-09-26 NOTE — ED ADULT NURSE NOTE - NSFALLRISKFACTORS_ED_ALL_ED
Incoming call from [facility] to clarify Valium prescription. Rx sent for 30 tablets but instructions say to only take once. Writer clarifies that this is a one time use for a single procedure. Patient only needs one tablet. Pharmacy agrees and will dispense one tablet.   
No indicators present

## 2024-10-02 ENCOUNTER — APPOINTMENT (OUTPATIENT)
Dept: SURGICAL ONCOLOGY | Facility: CLINIC | Age: 79
End: 2024-10-02
Payer: MEDICARE

## 2024-10-02 DIAGNOSIS — Z90.49 ACQUIRED ABSENCE OF OTHER SPECIFIED PARTS OF DIGESTIVE TRACT: ICD-10-CM

## 2024-10-02 PROCEDURE — 99212 OFFICE O/P EST SF 10 MIN: CPT

## 2024-10-02 RX ORDER — FAMOTIDINE 40 MG/1
TABLET, FILM COATED ORAL
Refills: 0 | Status: ACTIVE | COMMUNITY

## 2024-10-02 NOTE — HISTORY OF PRESENT ILLNESS
[de-identified] : Patient Name: JOHN ENAMORADO  MRN: [id number]  Date: 10/02/2024   Diagnosis: Acute cholecystitis Operative Date: 1/25/24 Procedure: Lap cholecystectomy Pathology: Chronic cholecystitis  She presents for a follow up. She is 8.5 months post op cholecystectomy c/b readmission for cholangitis. MRI didn't show evidence of common duct stones but there was evidence of cholangitis which was treated with IV antibiotics. She had follow up LFTs that were elevated and a follow up MRI in March 2024 which showed no choledocholithiasis but did show a CBD 1.4cm. She is here to review a CT AP done on 7/31/24 for abdominal pain which didn't show any acute pathology. It did show CBD dilatation up to 1.0 cm with distal tapering. 9/2024 - repeat LFTs are within normal limits.  She feels well except for complaints of diarrhea and constipation, she denies any more complaints of abdominal pain. She denies nauseousness or vomiting but does take Pepcid daily because if she doesn't take it she does feel nauseous.

## 2024-10-02 NOTE — ASSESSMENT
[FreeTextEntry1] : Impression) normal follow-up exam  Plan) reviewed current clinical data with the patient imaging is within normal limits and her LFTs have now completely normalized and at this point everything has resolved.  There is no need for any follow-up interventions at this time.  All questions answered.  Danielito Magallon MD  Chief Surgical Oncology Multidisciplinary GI cancer program [facility]  Professor Surgery [facility]   CC Dr. Leidy Montemayor  Time spent 15 minutes

## 2024-10-02 NOTE — REASON FOR VISIT
[Home] : at home, [unfilled] , at the time of the visit. [Medical Office: ([facility])___] : at the medical office located in  [Patient] : the patient

## 2024-10-02 NOTE — HISTORY OF PRESENT ILLNESS
[de-identified] : Patient Name: JOHN ENAMORADO  MRN: [id number]  Date: 10/02/2024   Diagnosis: Acute cholecystitis Operative Date: 1/25/24 Procedure: Lap cholecystectomy Pathology: Chronic cholecystitis  She presents for a follow up. She is 8.5 months post op cholecystectomy c/b readmission for cholangitis. MRI didn't show evidence of common duct stones but there was evidence of cholangitis which was treated with IV antibiotics. She had follow up LFTs that were elevated and a follow up MRI in March 2024 which showed no choledocholithiasis but did show a CBD 1.4cm. She is here to review a CT AP done on 7/31/24 for abdominal pain which didn't show any acute pathology. It did show CBD dilatation up to 1.0 cm with distal tapering. 9/2024 - repeat LFTs are within normal limits.  She feels well except for complaints of diarrhea and constipation, she denies any more complaints of abdominal pain. She denies nauseousness or vomiting but does take Pepcid daily because if she doesn't take it she does feel nauseous.

## 2024-10-02 NOTE — ASSESSMENT
[FreeTextEntry1] : Impression) normal follow-up exam  Plan) reviewed current clinical data with the patient imaging is within normal limits and her LFTs have now completely normalized and at this point everything has resolved.  There is no need for any follow-up interventions at this time.  All questions answered.  Danielito Magallon MD  Chief Surgical Oncology Multidisciplinary GI cancer program [facility]  Professor Surgery [facility]   CC Dr. Leidy Montemayor  Time spent 15 minutes

## 2024-10-02 NOTE — REASON FOR VISIT
[Home] : at home, [unfilled] , at the time of the visit. [Medical Office: ([facility])___] : at the medical office located in  [Patient] : the patient

## 2024-11-04 ENCOUNTER — APPOINTMENT (OUTPATIENT)
Dept: MAMMOGRAPHY | Facility: HOSPITAL | Age: 79
End: 2024-11-04

## 2024-11-11 ENCOUNTER — OUTPATIENT (OUTPATIENT)
Dept: OUTPATIENT SERVICES | Facility: HOSPITAL | Age: 79
LOS: 1 days | End: 2024-11-11

## 2024-11-11 ENCOUNTER — APPOINTMENT (OUTPATIENT)
Dept: MAMMOGRAPHY | Facility: CLINIC | Age: 79
End: 2024-11-11
Payer: MEDICARE

## 2024-11-11 PROCEDURE — 77067 SCR MAMMO BI INCL CAD: CPT | Mod: 26

## 2024-11-11 PROCEDURE — 77063 BREAST TOMOSYNTHESIS BI: CPT | Mod: 26

## 2024-11-11 PROCEDURE — 76641 ULTRASOUND BREAST COMPLETE: CPT | Mod: 26,50

## 2024-11-13 NOTE — ED ADULT TRIAGE NOTE - ARRIVAL FROM
-- DO NOT REPLY / DO NOT REPLY ALL --  -- This inbox is not monitored. If this was sent to the wrong provider or department, reroute message to P ECO Reroute pool. --  -- Message is from Engagement Center Operations (ECO) --    General Patient Message: ARMANI Mar from [facility] transplant center is calling back ARMANI Cartagena to discuss paging provider, ARMANI Mar states she cannot page provider, that RN from Dr. Soto's office will have to call the access center. Successfully connected RN to RN.    Caller Information       Contact Date/Time Type Contact Phone/Fax    11/12/2024 01:27 PM CST Phone (Outgoing) Dr Rachelle Smith MD (Provider) [phone]    Not Available -  sent fax    11/13/2024 08:37 AM CST Phone (Incoming) harlan garcia [phone]                       Home

## 2024-11-20 ENCOUNTER — NON-APPOINTMENT (OUTPATIENT)
Age: 79
End: 2024-11-20

## 2025-04-17 NOTE — H&P ADULT - HISTORY OF PRESENT ILLNESS
79yo Female pt with PMH GERD and diverticulosis with recent diverticulitis within last year treated with abx (per chart review also PSVT, hepatitis C), PSHx hip surgery (2 years ago s/p mechanical fall) presents to [facility] ED on 1/18 with 3-day history of worsening epigastric pain. Patient states her pain started in the epigastric area now also RUQ, cramping, associated with nausea and NBNB emesis leading to decreased PO intake. Also reports non-bloody diarrhea. Denies fevers/chills.    Last EGD 9 months ago with findings of small hiatal hernia. Last colonoscopy 2 years ago with 2 benign polyps.  Denies family hx of IBS, Crohn's, UC, or colon cancer.    In the ED, pt afebrile, nontachycardic, hypertensive to 160s. On exam, abdomen is soft, nondistended, mild ttp in epigastric and RUQ without rebound or guarding. Labs show WBC 7.88, K 3.2, T bili 1.6m Alk Phos 295, AST//497. CT AP demonstrates dilated CBD to 1.3cm with wall thickening; gallbladder with wall thickening and possible wall edema; probable wall thickening of cystic duct. RUQ US demonstrates dilated CBD to 11.5mm with mild smooth wall thickening up to 4mm, biliary sludge.    PMH: divericulosis c/b diverticulitis, GERD  PSx: hip surgery 2 years after mechanical fall  Social Hx: non-smoker, doesn't drink, no illicit drug use  Family Hx: breast cancer in multiple cousins    Meds: pantroprazole 40mg   Male

## (undated) DEVICE — TROCAR APPLIED MEDICAL KII FIOS FIRST ENTRY 5MM X 100MM Z-THREAD

## (undated) DEVICE — ELCTR GROUNDING PAD ADULT COVIDIEN

## (undated) DEVICE — WARMING BLANKET UPPER ADULT

## (undated) DEVICE — HANDLE ETHICON ENDOPATH PROBE PLUS II PISTOL GRIP 5MM

## (undated) DEVICE — PACK GENERAL LAPAROSCOPY

## (undated) DEVICE — LAPAROSCOPIC ENDO FLO IRRIGATOR DISP

## (undated) DEVICE — BAG ETHICON SPECIMEN RETRIEVAL 4 X 6"

## (undated) DEVICE — TIP METZENBAUM SCISSOR MONOPOLAR ENDOCUT (ORANGE)

## (undated) DEVICE — D HELP - CLEARVIEW CLEARIFY SYSTEM

## (undated) DEVICE — TUBING STRYKER PNEUMOCLEAR HIGH FLOW

## (undated) DEVICE — SLEEVE APPLIED MEDICAL KII 5MM X100MM Z-THREAD

## (undated) DEVICE — TUBING SUCTION NONCONDUCTIVE 6MM X 12FT

## (undated) DEVICE — ELCTR SHAFT ETHICON  ENDOPATH PLUS II HOOK 5MM X 34CM

## (undated) DEVICE — TUBING PLUME AWAY 4.0

## (undated) DEVICE — CLIP APPLR DISP 5MM

## (undated) DEVICE — SPONGE ENDO PEANUT 5MM

## (undated) DEVICE — GLV 7.5 PROTEXIS (WHITE)

## (undated) DEVICE — SUT VICRYL 0 18" ENDOLOOP LIGATURE

## (undated) DEVICE — SUT VICRYL 0 27" UR-6

## (undated) DEVICE — Device

## (undated) DEVICE — VENODYNE/SCD SLEEVE CALF MEDIUM

## (undated) DEVICE — DRSG DERMABOND 0.7ML

## (undated) DEVICE — SUT MONOCRYL 4-0 27" PS-2 UNDYED

## (undated) DEVICE — NDL MAX CORE 18G X 25CM

## (undated) DEVICE — POSITIONER FOAM EGG CRATE ULNAR 2PCS (PINK)

## (undated) DEVICE — BLADE SURGICAL #15 CARBON